# Patient Record
Sex: MALE | Race: WHITE | NOT HISPANIC OR LATINO | URBAN - METROPOLITAN AREA
[De-identification: names, ages, dates, MRNs, and addresses within clinical notes are randomized per-mention and may not be internally consistent; named-entity substitution may affect disease eponyms.]

---

## 2017-08-23 ENCOUNTER — EMERGENCY (EMERGENCY)
Facility: HOSPITAL | Age: 72
LOS: 0 days | Discharge: HOME | End: 2017-08-23
Attending: EMERGENCY MEDICINE | Admitting: EMERGENCY MEDICINE
Payer: MEDICARE

## 2017-08-23 VITALS
SYSTOLIC BLOOD PRESSURE: 139 MMHG | HEART RATE: 78 BPM | RESPIRATION RATE: 18 BRPM | DIASTOLIC BLOOD PRESSURE: 76 MMHG | TEMPERATURE: 98 F | OXYGEN SATURATION: 100 %

## 2017-08-23 VITALS
SYSTOLIC BLOOD PRESSURE: 143 MMHG | DIASTOLIC BLOOD PRESSURE: 78 MMHG | OXYGEN SATURATION: 100 % | HEIGHT: 67 IN | HEART RATE: 97 BPM | WEIGHT: 190.04 LBS | TEMPERATURE: 98 F | RESPIRATION RATE: 18 BRPM

## 2017-08-23 DIAGNOSIS — R68.83 CHILLS (WITHOUT FEVER): ICD-10-CM

## 2017-08-23 LAB
ALBUMIN SERPL ELPH-MCNC: 3.9 G/DL — SIGNIFICANT CHANGE UP (ref 3.3–5)
ALP SERPL-CCNC: 88 U/L — SIGNIFICANT CHANGE UP (ref 40–120)
ALT FLD-CCNC: 35 U/L — SIGNIFICANT CHANGE UP (ref 12–78)
ANION GAP SERPL CALC-SCNC: 11 MMOL/L — SIGNIFICANT CHANGE UP (ref 5–17)
ANISOCYTOSIS BLD QL: SIGNIFICANT CHANGE UP
APPEARANCE UR: CLEAR — SIGNIFICANT CHANGE UP
APTT BLD: 27.2 SEC — LOW (ref 27.5–37.4)
AST SERPL-CCNC: 16 U/L — SIGNIFICANT CHANGE UP (ref 15–37)
BASO STIPL BLD QL SMEAR: SLIGHT — SIGNIFICANT CHANGE UP
BASOPHILS # BLD AUTO: 0.1 K/UL — SIGNIFICANT CHANGE UP (ref 0–0.2)
BASOPHILS NFR BLD AUTO: 1.2 % — SIGNIFICANT CHANGE UP (ref 0–2)
BILIRUB SERPL-MCNC: 1 MG/DL — SIGNIFICANT CHANGE UP (ref 0.2–1.2)
BILIRUB UR-MCNC: NEGATIVE — SIGNIFICANT CHANGE UP
BUN SERPL-MCNC: 27 MG/DL — HIGH (ref 7–23)
BURR CELLS BLD QL SMEAR: PRESENT — SIGNIFICANT CHANGE UP
CALCIUM SERPL-MCNC: 8.8 MG/DL — SIGNIFICANT CHANGE UP (ref 8.5–10.1)
CHLORIDE SERPL-SCNC: 104 MMOL/L — SIGNIFICANT CHANGE UP (ref 96–108)
CO2 SERPL-SCNC: 24 MMOL/L — SIGNIFICANT CHANGE UP (ref 22–31)
COLOR SPEC: YELLOW — SIGNIFICANT CHANGE UP
CREAT SERPL-MCNC: 1.46 MG/DL — HIGH (ref 0.5–1.3)
DACRYOCYTES BLD QL SMEAR: SIGNIFICANT CHANGE UP
DIFF PNL FLD: NEGATIVE — SIGNIFICANT CHANGE UP
ELLIPTOCYTES BLD QL SMEAR: SIGNIFICANT CHANGE UP
EOSINOPHIL # BLD AUTO: 0.1 K/UL — SIGNIFICANT CHANGE UP (ref 0–0.5)
EOSINOPHIL NFR BLD AUTO: 1.5 % — SIGNIFICANT CHANGE UP (ref 0–6)
GLUCOSE SERPL-MCNC: 127 MG/DL — HIGH (ref 70–99)
GLUCOSE UR QL: NEGATIVE MG/DL — SIGNIFICANT CHANGE UP
HCT VFR BLD CALC: 34.3 % — LOW (ref 39–50)
HGB BLD-MCNC: 10.5 G/DL — LOW (ref 13–17)
HYPOCHROMIA BLD QL: SIGNIFICANT CHANGE UP
INR BLD: 1.2 RATIO — HIGH (ref 0.88–1.16)
KETONES UR-MCNC: NEGATIVE — SIGNIFICANT CHANGE UP
LEUKOCYTE ESTERASE UR-ACNC: NEGATIVE — SIGNIFICANT CHANGE UP
LYMPHOCYTES # BLD AUTO: 1.1 K/UL — SIGNIFICANT CHANGE UP (ref 1–3.3)
LYMPHOCYTES # BLD AUTO: 14 % — SIGNIFICANT CHANGE UP (ref 13–44)
MACROCYTES BLD QL: SLIGHT — SIGNIFICANT CHANGE UP
MAGNESIUM SERPL-MCNC: 2.2 MG/DL — SIGNIFICANT CHANGE UP (ref 1.6–2.6)
MANUAL DIF COMMENT BLD-IMP: SIGNIFICANT CHANGE UP
MCHC RBC-ENTMCNC: 18.8 PG — LOW (ref 27–34)
MCHC RBC-ENTMCNC: 30.5 GM/DL — LOW (ref 32–36)
MCV RBC AUTO: 61.7 FL — LOW (ref 80–100)
MICROCYTES BLD QL: SIGNIFICANT CHANGE UP
MONOCYTES # BLD AUTO: 0.4 K/UL — SIGNIFICANT CHANGE UP (ref 0–0.9)
MONOCYTES NFR BLD AUTO: 5.5 % — SIGNIFICANT CHANGE UP (ref 2–14)
NEUTROPHILS # BLD AUTO: 5.8 K/UL — SIGNIFICANT CHANGE UP (ref 1.8–7.4)
NEUTROPHILS NFR BLD AUTO: 77.8 % — HIGH (ref 43–77)
NITRITE UR-MCNC: NEGATIVE — SIGNIFICANT CHANGE UP
PH UR: 6 — SIGNIFICANT CHANGE UP (ref 5–8)
PLAT MORPH BLD: NORMAL — SIGNIFICANT CHANGE UP
PLATELET # BLD AUTO: 194 K/UL — SIGNIFICANT CHANGE UP (ref 150–400)
PLATELET COUNT - ESTIMATE: NORMAL — SIGNIFICANT CHANGE UP
POIKILOCYTOSIS BLD QL AUTO: SIGNIFICANT CHANGE UP
POLYCHROMASIA BLD QL SMEAR: SLIGHT — SIGNIFICANT CHANGE UP
POTASSIUM SERPL-MCNC: 3.2 MMOL/L — LOW (ref 3.5–5.3)
POTASSIUM SERPL-SCNC: 3.2 MMOL/L — LOW (ref 3.5–5.3)
PROT SERPL-MCNC: 7.1 GM/DL — SIGNIFICANT CHANGE UP (ref 6–8.3)
PROT UR-MCNC: NEGATIVE MG/DL — SIGNIFICANT CHANGE UP
PROTHROM AB SERPL-ACNC: 13 SEC — HIGH (ref 9.8–12.7)
RBC # BLD: 5.56 M/UL — SIGNIFICANT CHANGE UP (ref 4.2–5.8)
RBC # FLD: 17.2 % — HIGH (ref 10.3–14.5)
RBC BLD AUTO: (no result)
SCHISTOCYTES BLD QL AUTO: SIGNIFICANT CHANGE UP
SODIUM SERPL-SCNC: 139 MMOL/L — SIGNIFICANT CHANGE UP (ref 135–145)
SP GR SPEC: 1.01 — SIGNIFICANT CHANGE UP (ref 1.01–1.02)
TARGETS BLD QL SMEAR: SIGNIFICANT CHANGE UP
UROBILINOGEN FLD QL: NEGATIVE MG/DL — SIGNIFICANT CHANGE UP
WBC # BLD: 7.5 K/UL — SIGNIFICANT CHANGE UP (ref 3.8–10.5)
WBC # FLD AUTO: 7.5 K/UL — SIGNIFICANT CHANGE UP (ref 3.8–10.5)

## 2017-08-23 PROCEDURE — 99283 EMERGENCY DEPT VISIT LOW MDM: CPT

## 2017-08-23 PROCEDURE — 93010 ELECTROCARDIOGRAM REPORT: CPT

## 2017-08-23 RX ORDER — POTASSIUM CHLORIDE 20 MEQ
40 PACKET (EA) ORAL ONCE
Qty: 0 | Refills: 0 | Status: COMPLETED | OUTPATIENT
Start: 2017-08-23 | End: 2017-08-23

## 2017-08-23 RX ORDER — POTASSIUM CHLORIDE 20 MEQ
40 PACKET (EA) ORAL ONCE
Qty: 0 | Refills: 0 | Status: DISCONTINUED | OUTPATIENT
Start: 2017-08-23 | End: 2017-08-23

## 2017-08-23 RX ORDER — SODIUM CHLORIDE 9 MG/ML
1000 INJECTION INTRAMUSCULAR; INTRAVENOUS; SUBCUTANEOUS ONCE
Qty: 0 | Refills: 0 | Status: COMPLETED | OUTPATIENT
Start: 2017-08-23 | End: 2017-08-23

## 2017-08-23 RX ADMIN — SODIUM CHLORIDE 1000 MILLILITER(S): 9 INJECTION INTRAMUSCULAR; INTRAVENOUS; SUBCUTANEOUS at 13:30

## 2017-08-23 RX ADMIN — Medication 40 MILLIEQUIVALENT(S): at 15:59

## 2017-08-23 NOTE — ED ADULT NURSE NOTE - OBJECTIVE STATEMENT
patient states that he has a h/o throat cancer dx 13 yrs ago tx with RT, patient states that 6 wks ago he started to feel like his throat was swollen and was seen by ENT 6 days ago and started on prednisone, taper last night patient started to feel very weak and shaky, spoke with ENT who told patient to d/c prednisone. patient continued to feel shaky and weak, came to ER for further evaluation

## 2017-08-23 NOTE — ED PROVIDER NOTE - CPE EDP HEAD NORM PED
Spine appears normal, range of motion is not limited, no muscle or joint tenderness Head atraumatic, normal cephalic shape.

## 2017-08-23 NOTE — ED PROVIDER NOTE - PROGRESS NOTE DETAILS
patient afebrile with no laboratory abnormalities requiring admission. patient appropriate for discharge with PMD f/u. precautions when to return to the ED given and understood.

## 2017-08-23 NOTE — ED PROVIDER NOTE - OBJECTIVE STATEMENT
72 y/o M with a PMHx of throat CA, thalassemia presents to the ED c/o worsening weakness over the past day. Pt states that he was recently started on Prednisone for sinus infection and noticed sx soon afterward. Pt states that he called PMD who advised to come to the ED if sx persist today. Pt currently calm, afebrile here in ED and denies any other acute c/o at this time.

## 2017-08-23 NOTE — ED ADULT TRIAGE NOTE - CHIEF COMPLAINT QUOTE
Patient comes to ED for shaky and weakness since yesterday. pt is on prednisone for sinus problems, spoke to PCP yesterday stated the feeling would pass. pt woke up this morning still feeling shaky

## 2017-08-24 LAB
CULTURE RESULTS: NO GROWTH — SIGNIFICANT CHANGE UP
SPECIMEN SOURCE: SIGNIFICANT CHANGE UP

## 2017-08-25 ENCOUNTER — INPATIENT (INPATIENT)
Facility: HOSPITAL | Age: 72
LOS: 2 days | Discharge: HOME | End: 2017-08-28
Attending: INTERNAL MEDICINE | Admitting: INTERNAL MEDICINE
Payer: MEDICARE

## 2017-08-25 VITALS
HEART RATE: 103 BPM | DIASTOLIC BLOOD PRESSURE: 77 MMHG | RESPIRATION RATE: 16 BRPM | OXYGEN SATURATION: 100 % | HEIGHT: 67 IN | SYSTOLIC BLOOD PRESSURE: 132 MMHG | TEMPERATURE: 98 F | WEIGHT: 190.04 LBS

## 2017-08-25 DIAGNOSIS — Z90.49 ACQUIRED ABSENCE OF OTHER SPECIFIED PARTS OF DIGESTIVE TRACT: Chronic | ICD-10-CM

## 2017-08-25 LAB
ADD ON TEST-SPECIMEN IN LAB: SIGNIFICANT CHANGE UP
ALBUMIN SERPL ELPH-MCNC: 4 G/DL — SIGNIFICANT CHANGE UP (ref 3.3–5)
ALP SERPL-CCNC: 90 U/L — SIGNIFICANT CHANGE UP (ref 40–120)
ALT FLD-CCNC: 32 U/L — SIGNIFICANT CHANGE UP (ref 12–78)
ANION GAP SERPL CALC-SCNC: 8 MMOL/L — SIGNIFICANT CHANGE UP (ref 5–17)
ANISOCYTOSIS BLD QL: SIGNIFICANT CHANGE UP
APPEARANCE UR: CLEAR — SIGNIFICANT CHANGE UP
AST SERPL-CCNC: 15 U/L — SIGNIFICANT CHANGE UP (ref 15–37)
BASO STIPL BLD QL SMEAR: SLIGHT — SIGNIFICANT CHANGE UP
BASOPHILS # BLD AUTO: 0.1 K/UL — SIGNIFICANT CHANGE UP (ref 0–0.2)
BASOPHILS NFR BLD AUTO: 0.9 % — SIGNIFICANT CHANGE UP (ref 0–2)
BILIRUB SERPL-MCNC: 1 MG/DL — SIGNIFICANT CHANGE UP (ref 0.2–1.2)
BILIRUB UR-MCNC: NEGATIVE — SIGNIFICANT CHANGE UP
BUN SERPL-MCNC: 23 MG/DL — SIGNIFICANT CHANGE UP (ref 7–23)
CALCIUM SERPL-MCNC: 8.8 MG/DL — SIGNIFICANT CHANGE UP (ref 8.5–10.1)
CHLORIDE SERPL-SCNC: 106 MMOL/L — SIGNIFICANT CHANGE UP (ref 96–108)
CK SERPL-CCNC: 32 U/L — SIGNIFICANT CHANGE UP (ref 26–308)
CO2 SERPL-SCNC: 24 MMOL/L — SIGNIFICANT CHANGE UP (ref 22–31)
COLOR SPEC: YELLOW — SIGNIFICANT CHANGE UP
CREAT SERPL-MCNC: 1.34 MG/DL — HIGH (ref 0.5–1.3)
DACRYOCYTES BLD QL SMEAR: SIGNIFICANT CHANGE UP
DIFF PNL FLD: NEGATIVE — SIGNIFICANT CHANGE UP
ELLIPTOCYTES BLD QL SMEAR: SLIGHT — SIGNIFICANT CHANGE UP
EOSINOPHIL # BLD AUTO: 0.1 K/UL — SIGNIFICANT CHANGE UP (ref 0–0.5)
EOSINOPHIL NFR BLD AUTO: 1.2 % — SIGNIFICANT CHANGE UP (ref 0–6)
GLUCOSE SERPL-MCNC: 126 MG/DL — HIGH (ref 70–99)
GLUCOSE UR QL: NEGATIVE MG/DL — SIGNIFICANT CHANGE UP
HCT VFR BLD CALC: 35.1 % — LOW (ref 39–50)
HGB BLD-MCNC: 10.6 G/DL — LOW (ref 13–17)
HYPOCHROMIA BLD QL: SIGNIFICANT CHANGE UP
KETONES UR-MCNC: NEGATIVE — SIGNIFICANT CHANGE UP
LEUKOCYTE ESTERASE UR-ACNC: NEGATIVE — SIGNIFICANT CHANGE UP
LYMPHOCYTES # BLD AUTO: 0.8 K/UL — LOW (ref 1–3.3)
LYMPHOCYTES # BLD AUTO: 9.2 % — LOW (ref 13–44)
MACROCYTES BLD QL: SIGNIFICANT CHANGE UP
MAGNESIUM SERPL-MCNC: 2.4 MG/DL — SIGNIFICANT CHANGE UP (ref 1.6–2.6)
MANUAL DIF COMMENT BLD-IMP: SIGNIFICANT CHANGE UP
MCHC RBC-ENTMCNC: 18.9 PG — LOW (ref 27–34)
MCHC RBC-ENTMCNC: 30.3 GM/DL — LOW (ref 32–36)
MCV RBC AUTO: 62.4 FL — LOW (ref 80–100)
MICROCYTES BLD QL: SIGNIFICANT CHANGE UP
MONOCYTES # BLD AUTO: 0.5 K/UL — SIGNIFICANT CHANGE UP (ref 0–0.9)
MONOCYTES NFR BLD AUTO: 5.9 % — SIGNIFICANT CHANGE UP (ref 2–14)
NEUTROPHILS # BLD AUTO: 7 K/UL — SIGNIFICANT CHANGE UP (ref 1.8–7.4)
NEUTROPHILS NFR BLD AUTO: 82.8 % — HIGH (ref 43–77)
NITRITE UR-MCNC: NEGATIVE — SIGNIFICANT CHANGE UP
OVALOCYTES BLD QL SMEAR: SLIGHT — SIGNIFICANT CHANGE UP
PH UR: 6.5 — SIGNIFICANT CHANGE UP (ref 5–8)
PLAT MORPH BLD: NORMAL — SIGNIFICANT CHANGE UP
PLATELET # BLD AUTO: 180 K/UL — SIGNIFICANT CHANGE UP (ref 150–400)
POIKILOCYTOSIS BLD QL AUTO: SIGNIFICANT CHANGE UP
POLYCHROMASIA BLD QL SMEAR: SLIGHT — SIGNIFICANT CHANGE UP
POTASSIUM SERPL-MCNC: 3.7 MMOL/L — SIGNIFICANT CHANGE UP (ref 3.5–5.3)
POTASSIUM SERPL-SCNC: 3.7 MMOL/L — SIGNIFICANT CHANGE UP (ref 3.5–5.3)
PROT SERPL-MCNC: 7.4 GM/DL — SIGNIFICANT CHANGE UP (ref 6–8.3)
PROT UR-MCNC: NEGATIVE MG/DL — SIGNIFICANT CHANGE UP
RBC # BLD: 5.63 M/UL — SIGNIFICANT CHANGE UP (ref 4.2–5.8)
RBC # FLD: 17.3 % — HIGH (ref 10.3–14.5)
RBC BLD AUTO: (no result)
SCHISTOCYTES BLD QL AUTO: SLIGHT — SIGNIFICANT CHANGE UP
SICKLE CELLS BLD QL SMEAR: SLIGHT — SIGNIFICANT CHANGE UP
SODIUM SERPL-SCNC: 138 MMOL/L — SIGNIFICANT CHANGE UP (ref 135–145)
SP GR SPEC: 1 — LOW (ref 1.01–1.02)
TARGETS BLD QL SMEAR: SIGNIFICANT CHANGE UP
TROPONIN I SERPL-MCNC: <0.015 NG/ML — SIGNIFICANT CHANGE UP (ref 0.01–0.04)
UROBILINOGEN FLD QL: NEGATIVE MG/DL — SIGNIFICANT CHANGE UP
WBC # BLD: 8.4 K/UL — SIGNIFICANT CHANGE UP (ref 3.8–10.5)
WBC # FLD AUTO: 8.4 K/UL — SIGNIFICANT CHANGE UP (ref 3.8–10.5)

## 2017-08-25 PROCEDURE — 99285 EMERGENCY DEPT VISIT HI MDM: CPT

## 2017-08-25 PROCEDURE — 70450 CT HEAD/BRAIN W/O DYE: CPT | Mod: 26

## 2017-08-25 PROCEDURE — 93010 ELECTROCARDIOGRAM REPORT: CPT

## 2017-08-25 PROCEDURE — 71010: CPT | Mod: 26

## 2017-08-25 RX ORDER — SENNA PLUS 8.6 MG/1
2 TABLET ORAL AT BEDTIME
Qty: 0 | Refills: 0 | Status: DISCONTINUED | OUTPATIENT
Start: 2017-08-25 | End: 2017-08-28

## 2017-08-25 RX ORDER — SODIUM CHLORIDE 9 MG/ML
1000 INJECTION INTRAMUSCULAR; INTRAVENOUS; SUBCUTANEOUS ONCE
Qty: 0 | Refills: 0 | Status: COMPLETED | OUTPATIENT
Start: 2017-08-25 | End: 2017-08-25

## 2017-08-25 RX ORDER — SODIUM CHLORIDE 9 MG/ML
1000 INJECTION INTRAMUSCULAR; INTRAVENOUS; SUBCUTANEOUS
Qty: 0 | Refills: 0 | Status: DISCONTINUED | OUTPATIENT
Start: 2017-08-25 | End: 2017-08-28

## 2017-08-25 RX ORDER — ACETAMINOPHEN 500 MG
650 TABLET ORAL EVERY 6 HOURS
Qty: 0 | Refills: 0 | Status: DISCONTINUED | OUTPATIENT
Start: 2017-08-25 | End: 2017-08-28

## 2017-08-25 RX ORDER — LEVOTHYROXINE SODIUM 125 MCG
25 TABLET ORAL DAILY
Qty: 0 | Refills: 0 | Status: DISCONTINUED | OUTPATIENT
Start: 2017-08-26 | End: 2017-08-28

## 2017-08-25 RX ORDER — DOCUSATE SODIUM 100 MG
100 CAPSULE ORAL THREE TIMES A DAY
Qty: 0 | Refills: 0 | Status: DISCONTINUED | OUTPATIENT
Start: 2017-08-25 | End: 2017-08-28

## 2017-08-25 RX ADMIN — SODIUM CHLORIDE 1000 MILLILITER(S): 9 INJECTION INTRAMUSCULAR; INTRAVENOUS; SUBCUTANEOUS at 15:15

## 2017-08-25 NOTE — ED PROVIDER NOTE - OBJECTIVE STATEMENT
71 yo male h/o throat cancer s/p RT, presents c/o weakness for about 1 week. Recently went to his ENT Dr. Parra and was placed on 6 days of prednisone for throat/sinus issues. On Tuesday afternoon after he finished the prednisone he had weakness, chills, and shaking. Saw his PCP Dr. Baldwin yesterday, was noted to have low BP, told to stop htn meds, drink a lot of fluids, and follow up on Monday, but he felt worse so he came into the ER. Denies cough, vomiting, diarrhea, peripheral edema. +Lower back pain. Seen in ER for similar 2 days ago. 73 yo male h/o thalassemia, throat cancer s/p RT, presents c/o weakness for about 1 week. Recently went to his ENT Dr. Parra and was placed on 6 days of prednisone for throat/sinus issues. On Tuesday afternoon after he finished the prednisone he had weakness, chills, and shaking. Saw his PCP Dr. Baldwin yesterday, was noted to have low BP, told to stop htn meds, drink a lot of fluids, and follow up on Monday, but he felt worse so he came into the ER. Denies cough, vomiting, diarrhea, peripheral edema. +Lower back pain. Seen in ER for similar 2 days ago.

## 2017-08-25 NOTE — ED ADULT NURSE REASSESSMENT NOTE - NS ED NURSE REASSESS COMMENT FT1
Pt alert and oriented. vs as charted. pt ambulated as per MD. Pt appears steady but states he feels "shaky and weak." pt and family updated on plan of care. no acute distress noted at this time. will continue to monitor.

## 2017-08-25 NOTE — H&P ADULT - ASSESSMENT
73 y/o M PMHx significant for HTN, b-thalassemia (minor), hx of Head and Neck Ca (s/p RT), former smoker, multinodular goiter, GERD, and prostatitis who was evaluated in the ED on 8/23 for symptoms of generalized weakness/malaise and chills which began on Tuesday 8/22. The patient was recently started on an outpatient 6 day course of Prednisone (completed on Tuesday) by his ENT (Dr. Parra). The patient admits to symptoms of associated rigors. He was formally evaluated by his PCP yesterday (pe ED documentation the pateint was found to have "low BP" at that time).       1)Generalized weakness/Malaise/Rigors with unknown etiology  ~DDx is vast; r/o sepsis (although does not fit SIRS and no apparent source of acute infxn) vs. adrenal insufficiency (VSS in the ED thus far), vs. Steroid induced myopathy vs. Hypothyroidism (pt on low dose levothyroxine)  ~f/u serum Cortisol  ~f/u TFTs  ~cont. IV fluids  ~CPK wNL  ~would PAN C+S  ~pt has had hx of prostatitis    2)HTN  ~will hold his anti-hypertensives for now    3)GERD  ~cont. PPI    4)Vte ppx  ~cont. Heparin sq 71 y/o M PMHx significant for HTN, b-thalassemia (minor), hx of Laryngeal Ca stage I (s/p RT), former smoker, radiation-induced hypothyroidism, GERD, BPH, and prostatitis who was evaluated in the ED on 8/23 for symptoms of generalized weakness/malaise and chills which began on Tuesday 8/22. The patient was recently evaluated by his ENT (Dr. Parra) last Thursday (8/17) for "throat soreness" and was diagnosed w/ laryngeal edema -> started on an outpatient (10 day) course of Prednisone (took 6/10 days stopped on Tuesday) as he had c/o generalized weakness/malaise and associated rigors. The patient then was formally evaluated by his PCP yesterday where he was noted have "a low blood pressure" -> was 105/56 (pt is normally hypertensive) thus told to hold his normally prescribed daily Hyzaar. The patient denies any associated shortness of breath, or fevers.      1)Generalized weakness/Malaise/Rigors with unknown etiology  ~DDx is vast; r/o sepsis (although does not fit SIRS and no apparent source of acute infxn) vs. adrenal insufficiency (VSS in the ED thus far), vs. Steroid induced myopathy vs. Hypothyroidism (pt on low dose levothyroxine)  ~f/u serum Cortisol  ~f/u TFTs  ~cont. IV fluids  ~CPK wNL  ~would PAN C+S  ~pt has had hx of prostatitis  ~f/u w/ ENT in the am  ~will give Dexamethasone 4mg IV bolus x 1    2)HTN  ~will hold his anti-hypertensives for now    3)GERD  ~cont. PPI    4)Vte ppx  ~cont. Heparin sq

## 2017-08-25 NOTE — H&P ADULT - PMH
Essential hypertension    Hypothyroidism, secondary    Retinopathy of right eye    Thalassemia major    Throat cancer

## 2017-08-25 NOTE — H&P ADULT - NSHPPHYSICALEXAM_GEN_ALL_CORE
Vital Signs Last 24 Hrs  T(C): 36.8 (25 Aug 2017 20:07), Max: 37.1 (25 Aug 2017 17:37)  T(F): 98.3 (25 Aug 2017 20:07), Max: 98.7 (25 Aug 2017 17:37)  HR: 93 (25 Aug 2017 20:07) (73 - 103)  BP: 140/78 (25 Aug 2017 20:07) (132/77 - 140/78)  RR: 18 (25 Aug 2017 20:07) (16 - 18)  SpO2: 99% (25 Aug 2017 20:07) (98% - 100%)

## 2017-08-25 NOTE — H&P ADULT - HISTORY OF PRESENT ILLNESS
71 y/o M PMHx significant for HTN, b-thalassemia (minor), hx of Head and Neck Ca (s/p RT), former smoker, multinodular goiter, GERD, and prostatitis who was evaluated in the ED on 8/23 for symptoms of generalized weakness/malaise and chills which began on Tuesday 8/22. The patient was recently started on an outpatient 6 day course of Prednisone (completed on Tuesday) by his ENT (Dr. Parra). The patient admits to symptoms of associated rigors. He was formally evaluated by his PCP yesterday (pe ED documentation the patient was found to have "low BP" at that time). 71 y/o M PMHx significant for HTN, b-thalassemia (minor), hx of Laryngeal Ca stage I (s/p RT), former smoker, radiation-induced hypothyroidism, GERD, BPH, and prostatitis who was evaluated in the ED on 8/23 for symptoms of generalized weakness/malaise and chills which began on Tuesday 8/22. The patient was recently evaluated by his ENT (Dr. Parra) last Thursday (8/17) for "throat soreness" and was diagnosed w/ laryngeal edema -> started on an outpatient (10 day) course of Prednisone (took 6/10 days stopped on Tuesday) as he had c/o generalized weakness/malaise and associated rigors. The patient then was formally evaluated by his PCP yesterday where he was noted have "a low blood pressure" -> was 105/56 (pt is normally hypertensive) thus told to hold his normally prescribed daily Hyzaar. The patient denies any associated shortness of breath, or fevers.

## 2017-08-25 NOTE — H&P ADULT - FAMILY HISTORY
Father  Still living? Unknown  Family history of early CAD, Age at diagnosis: Age Unknown     Mother  Still living? Unknown  Family history of early CAD, Age at diagnosis: Age Unknown     Sibling  Still living? Unknown  Family history of breast cancer, Age at diagnosis: Age Unknown

## 2017-08-25 NOTE — ED PROVIDER NOTE - PROGRESS NOTE DETAILS
Pop Markham ([facility]) for Dr. Cowart: Spoke with Dr. Perez covering for Dr. Baldwin. Suggests admission for further workup as this is patient's second ER visit in two days, r/o renal insufficiency and hematology evaluation due to marked tear drop cells in CBC Pop Markham ([facility]) for Dr. Cowart: Spoke with Dr. Perez covering for Dr. Baldwin. Suggests admission for further workup as this is patient's second ER visit in two days, r/o renal insufficiency Pop Markham ([facility]) for Dr. Cowart: Spoke with Dr. Hodgson covering for Dr. Davalos. States that thalessemia shouldn't be the cause of his symptoms as his H&H is stable. Says if patient is admitted she can be consulted but otherwise there's no emergent need for her to see the patient

## 2017-08-25 NOTE — ED PROVIDER NOTE - NS_ ATTENDINGSCRIBEDETAILS _ED_A_ED_FT
I, Zurdo Cowart, performed the initial face to face bedside interview with this patient regarding history of present illness, review of symptoms and relevant past medical, social and family history.  I completed an independent physical examination.  I was the initial provider who evaluated this patient.  The history, relevant review of systems, past medical and surgical history, medical decision making, and physical examination was documented by the scribe in my presence and I attest to the accuracy of the documentation.

## 2017-08-26 LAB
ALBUMIN SERPL ELPH-MCNC: 3.7 G/DL — SIGNIFICANT CHANGE UP (ref 3.3–5)
ALP SERPL-CCNC: 80 U/L — SIGNIFICANT CHANGE UP (ref 40–120)
ALT FLD-CCNC: 28 U/L — SIGNIFICANT CHANGE UP (ref 12–78)
ANION GAP SERPL CALC-SCNC: 11 MMOL/L — SIGNIFICANT CHANGE UP (ref 5–17)
AST SERPL-CCNC: 12 U/L — LOW (ref 15–37)
BASOPHILS # BLD AUTO: 0.1 K/UL — SIGNIFICANT CHANGE UP (ref 0–0.2)
BASOPHILS NFR BLD AUTO: 0.7 % — SIGNIFICANT CHANGE UP (ref 0–2)
BILIRUB SERPL-MCNC: 1.1 MG/DL — SIGNIFICANT CHANGE UP (ref 0.2–1.2)
BUN SERPL-MCNC: 20 MG/DL — SIGNIFICANT CHANGE UP (ref 7–23)
CALCIUM SERPL-MCNC: 8.6 MG/DL — SIGNIFICANT CHANGE UP (ref 8.5–10.1)
CHLORIDE SERPL-SCNC: 109 MMOL/L — HIGH (ref 96–108)
CO2 SERPL-SCNC: 20 MMOL/L — LOW (ref 22–31)
CORTIS F PM SERPL-MCNC: 5.9 UG/DL — SIGNIFICANT CHANGE UP (ref 2.7–10.5)
CREAT SERPL-MCNC: 1.28 MG/DL — SIGNIFICANT CHANGE UP (ref 0.5–1.3)
CULTURE RESULTS: NO GROWTH — SIGNIFICANT CHANGE UP
EOSINOPHIL # BLD AUTO: 0.1 K/UL — SIGNIFICANT CHANGE UP (ref 0–0.5)
EOSINOPHIL NFR BLD AUTO: 0.7 % — SIGNIFICANT CHANGE UP (ref 0–6)
GLUCOSE SERPL-MCNC: 126 MG/DL — HIGH (ref 70–99)
HCT VFR BLD CALC: 32.7 % — LOW (ref 39–50)
HGB BLD-MCNC: 9.8 G/DL — LOW (ref 13–17)
LYMPHOCYTES # BLD AUTO: 0.4 K/UL — LOW (ref 1–3.3)
LYMPHOCYTES # BLD AUTO: 5.1 % — LOW (ref 13–44)
MAGNESIUM SERPL-MCNC: 2.4 MG/DL — SIGNIFICANT CHANGE UP (ref 1.6–2.6)
MCHC RBC-ENTMCNC: 18.7 PG — LOW (ref 27–34)
MCHC RBC-ENTMCNC: 30 GM/DL — LOW (ref 32–36)
MCV RBC AUTO: 62.5 FL — LOW (ref 80–100)
MONOCYTES # BLD AUTO: 0.2 K/UL — SIGNIFICANT CHANGE UP (ref 0–0.9)
MONOCYTES NFR BLD AUTO: 2.1 % — SIGNIFICANT CHANGE UP (ref 2–14)
NEUTROPHILS # BLD AUTO: 7.6 K/UL — HIGH (ref 1.8–7.4)
NEUTROPHILS NFR BLD AUTO: 91.3 % — HIGH (ref 43–77)
PLATELET # BLD AUTO: 149 K/UL — LOW (ref 150–400)
POTASSIUM SERPL-MCNC: 4.3 MMOL/L — SIGNIFICANT CHANGE UP (ref 3.5–5.3)
POTASSIUM SERPL-SCNC: 4.3 MMOL/L — SIGNIFICANT CHANGE UP (ref 3.5–5.3)
PROT SERPL-MCNC: 6.6 GM/DL — SIGNIFICANT CHANGE UP (ref 6–8.3)
RBC # BLD: 5.24 M/UL — SIGNIFICANT CHANGE UP (ref 4.2–5.8)
RBC # FLD: 17.5 % — HIGH (ref 10.3–14.5)
SODIUM SERPL-SCNC: 140 MMOL/L — SIGNIFICANT CHANGE UP (ref 135–145)
SPECIMEN SOURCE: SIGNIFICANT CHANGE UP
T3 SERPL-MCNC: 89 NG/DL — SIGNIFICANT CHANGE UP (ref 80–200)
T4 AB SER-ACNC: 7.3 UG/DL — SIGNIFICANT CHANGE UP (ref 4.6–12)
TSH SERPL-MCNC: 1.9 UIU/ML — SIGNIFICANT CHANGE UP (ref 0.36–3.74)
WBC # BLD: 8.3 K/UL — SIGNIFICANT CHANGE UP (ref 3.8–10.5)
WBC # FLD AUTO: 8.3 K/UL — SIGNIFICANT CHANGE UP (ref 3.8–10.5)

## 2017-08-26 PROCEDURE — 70490 CT SOFT TISSUE NECK W/O DYE: CPT | Mod: 26

## 2017-08-26 RX ORDER — FLUTICASONE PROPIONATE 50 MCG
2 SPRAY, SUSPENSION NASAL DAILY
Qty: 0 | Refills: 0 | Status: DISCONTINUED | OUTPATIENT
Start: 2017-08-26 | End: 2017-08-26

## 2017-08-26 RX ORDER — SODIUM CHLORIDE 0.65 %
1 AEROSOL, SPRAY (ML) NASAL
Qty: 0 | Refills: 0 | Status: DISCONTINUED | OUTPATIENT
Start: 2017-08-26 | End: 2017-08-28

## 2017-08-26 RX ORDER — DOXAZOSIN MESYLATE 4 MG
1 TABLET ORAL AT BEDTIME
Qty: 0 | Refills: 0 | Status: DISCONTINUED | OUTPATIENT
Start: 2017-08-26 | End: 2017-08-28

## 2017-08-26 RX ORDER — DEXAMETHASONE 0.5 MG/5ML
4 ELIXIR ORAL ONCE
Qty: 0 | Refills: 0 | Status: COMPLETED | OUTPATIENT
Start: 2017-08-26 | End: 2017-08-26

## 2017-08-26 RX ORDER — PANTOPRAZOLE SODIUM 20 MG/1
40 TABLET, DELAYED RELEASE ORAL
Qty: 0 | Refills: 0 | Status: DISCONTINUED | OUTPATIENT
Start: 2017-08-26 | End: 2017-08-27

## 2017-08-26 RX ADMIN — SODIUM CHLORIDE 100 MILLILITER(S): 9 INJECTION INTRAMUSCULAR; INTRAVENOUS; SUBCUTANEOUS at 12:58

## 2017-08-26 RX ADMIN — Medication 25 MICROGRAM(S): at 05:10

## 2017-08-26 RX ADMIN — SODIUM CHLORIDE 100 MILLILITER(S): 9 INJECTION INTRAMUSCULAR; INTRAVENOUS; SUBCUTANEOUS at 23:27

## 2017-08-26 RX ADMIN — SODIUM CHLORIDE 100 MILLILITER(S): 9 INJECTION INTRAMUSCULAR; INTRAVENOUS; SUBCUTANEOUS at 01:21

## 2017-08-26 RX ADMIN — Medication 4 MILLIGRAM(S): at 01:21

## 2017-08-26 RX ADMIN — Medication 1 MILLIGRAM(S): at 18:33

## 2017-08-26 NOTE — PROGRESS NOTE ADULT - ASSESSMENT
71 y/o M PMHx significant for HTN, b-thalassemia (minor), hx of Laryngeal Ca stage I (s/p RT), former smoker, radiation-induced hypothyroidism, GERD, BPH, and prostatitis who was evaluated in the ED on 8/23 for symptoms of generalized weakness/malaise and chills which began on Tuesday 8/22. The patient was recently evaluated by his ENT (Dr. Parra) last Thursday (8/17) for "throat soreness" and was diagnosed w/ laryngeal edema -> started on an outpatient (10 day) course of Prednisone (took 6/10 days stopped on Tuesday) as he had c/o generalized weakness/malaise and associated rigors. The patient then was formally evaluated by his PCP  on 8/24  where he was noted have "a low blood pressure" -> was 105/56 (pt is normally hypertensive) thus told to hold his normally prescribed daily Hyzaar. The patient  was admitted on 8/25/17 with weakness , sore throat .       # Generalized weakness/Malaise/Rigors with unknown etiology  ~DDx is vast; r/o sepsis (although does not fit SIRS and no apparent source of acute infxn) vs. adrenal insufficiency (VSS in the ED thus far), vs. Steroid induced myopathy vs. Hypothyroidism (pt on low dose levothyroxine)  ~f/u serum Cortisol - pending  ~f/u TFTs wnl  ~cont. IV fluids  ~CPK wNL  ~would PAN C+S  ~ENT consult - pending  ~s/p  Dexamethasone 4mg IV bolus x 1 on 8/25  ~ pt was on 5 days course of prednisone without improvement of the sore throat  ~ CT neck -  Right thyroid nodule 6x5x5 cm with mass effect on trachea to the left  fullness in glottic region corresponding to CA, no LAD    # SUE  - c/w IV fluids  - renal sono to r/o obstruction given BPH    # Anemia, worsening, microcytic  - iron studies, B12, folate  - hematology -oncology consult    # HTN - overcontrolled  - losartan 50/HCTz 12.5 - on hold  - terazosin 10 mg - on hold    # BPH  - restart  low dose dozasosin 1 mg tonight    # GERD  ~cont. PPI    4)Vte ppx  ~cont. Heparin sq    Dispo- IV fluids, renal sono, f/u cultures, ENT , hematology evaluation

## 2017-08-26 NOTE — CONSULT NOTE ADULT - SUBJECTIVE AND OBJECTIVE BOX
Hx of Laryngeal CA 15 years ago treated with xrt. Ca free but post xrt hypothyroidism.  Recent ENT eval with laryngeal edema, rx with steroids and had severe fatigue  Since admitted to hospital c/o throat pain    Has pmh of reflux treated with Prilosec, but did not take prilosec today.  C/o of dry air in room    Head and Neck exam performed    OC nl tongue base, nl buccal mucosa, nl tonsil  OP with eryth   Neck supple, nl laryngeal crepitus  nodular thyroid    CT H&N without evidence of lesions

## 2017-08-26 NOTE — PROGRESS NOTE ADULT - SUBJECTIVE AND OBJECTIVE BOX
Subjective:  Patient is a 72y old  Male who presents with a chief complaint of Generalized Weakness     HPI:    71 y/o M PMHx significant for HTN, b-thalassemia (minor), hx of Laryngeal Ca stage I (s/p RT), former smoker, radiation-induced hypothyroidism, GERD, BPH, and prostatitis who was evaluated in the ED on  for symptoms of generalized weakness/malaise and chills which began on . The patient was recently evaluated by his ENT (Dr. Parra) last Thursday () for "throat soreness" and was diagnosed w/ laryngeal edema -> started on an outpatient (10 day) course of Prednisone (took 6/10 days stopped on Tuesday) as he had c/o generalized weakness/malaise and associated rigors. The patient then was formally evaluated by his PCP  on   where he was noted have "a low blood pressure" -> was 105/56 (pt is normally hypertensive) thus told to hold his normally prescribed daily Hyzaar. The patient  was admitted on 17 with weakness , sore throat .     17 - Patient seen and examined at bedside earlier today, + sore throat, weakness improved , Bp stable, denies dizziness, CP, palpitations    Review of system- Rest of the review of system are negative except mentioned in HPI    OBJECTIVE:   T(C): 36.7 (17 @ 14:12), Max: 37.1 (17 @ 17:37)  HR: 77 (17 @ 14:12) (62 - 103)  BP: 122/62 (17 @ 14:12) (120/68 - 149/70)  RR: 16 (17 @ 14:12) (16 - 18)  SpO2: 97% (17 @ 14:12) (97% - 100%)  Wt(kg): --  Daily Height in cm: 170.18 (25 Aug 2017 14:34)    Daily     PHYSICAL EXAM:  GENERAL: NAD  NERVOUS SYSTEM:  Alert & Oriented X3, non- focal exam  HEAD:  Atraumatic, Normocephalic  EYES: EOMI, PERRLA, conjunctiva and sclera clear  HEENT: dry  mucous membranes  NECK: Supple, No JVD  CHEST/LUNG: Clear to auscultation bilaterally; No rales, no rhonchi, no wheezing, or rubs  HEART: Regular rate and rhythm; No murmurs, rubs, or gallops  ABDOMEN: Soft, Nontender, Nondistended; Bowel sounds present  GENITOURINARY- Voiding, no suprapubic tenderness  EXTREMITIES:  2+ Peripheral Pulses, No clubbing, cyanosis, or edema  MUSCULOSKELETAL:- No muscle tenderness, Muscle tone normal, No joint tenderness, no Joint swelling, Joint range of motion-normal  SKIN-no rash, no lesion    LABS:                        9.8    8.3   )-----------( 149      ( 26 Aug 2017 05:23 )             32.7         140  |  109<H>  |  20  ----------------------------<  126<H>  4.3   |  20<L>  |  1.28    Ca    8.6      26 Aug 2017 05:23  Mg     2.4         TPro  6.6  /  Alb  3.7  /  TBili  1.1  /  DBili  x   /  AST  12<L>  /  ALT  28  /  AlkPhos  80        CARDIAC MARKERS ( 25 Aug 2017 15:12 )  <0.015 ng/mL / x     / 32 U/L / x     / x      Cortisol PM, Serum (17 @ 23:33)    Cortisol PM, Serum: 5.9: Note reference range change for CORTAM and CORTPM. ug/dL    Thyroid Stimulating Hormone, Serum (17 @ 23:33)    Thyroid Stimulating Hormone, Serum: 1.900 uIU/mL        Urinalysis Basic - ( 25 Aug 2017 15:12 )    Color: Yellow / Appearance: Clear / S.005 / pH: x  Gluc: x / Ketone: Negative  / Bili: Negative / Urobili: Negative mg/dL   Blood: x / Protein: Negative mg/dL / Nitrite: Negative   Leuk Esterase: Negative / RBC: x / WBC x   Sq Epi: x / Non Sq Epi: x / Bacteria: x        CAPILLARY BLOOD GLUCOSE    RECENT CULTURES:  Culture - Urine (17 @ 15:02)    Specimen Source: .Urine None    Culture Results:   No growth      RADIOLOGY & ADDITIONAL TESTS:  < from: CT Neck Soft Tissue No Cont (17 @ 01:12) >    No supraglottic airway narrowing.  Asymmetric soft tissue fullness in the glottic region corresponding to   known laryngeal malignancy.   No obvious bony destruction or soft tissue extension.  Enlarged right sided thyroid goiter nodule.  No massive cervical lymphadenopathy.  < from: CT Neck Soft Tissue No Cont (17 @ 01:12) >  There is a dominant right thyroid goiter nodule measuring 6.7 x 5.1 x 5.5   cm resulting in mild mass effect of the trachea to the left.    < end of copied text >    < end of copied text >    < from: CT Head No Cont (17 @ 21:28) >  IMPRESSION:   No acute territorial infarct, hemorrhage, mass or mass effect.      < end of copied text >  < from: Xray Chest 1 View AP/PA. (17 @ 15:55) >  The lungs are clear.  No pleural abnormality is seen.      Cardiomediastinal silhouette is intact.    < end of copied text >      MEDICATIONS  (STANDING):  sodium chloride 0.9%. 1000 milliLiter(s) (100 mL/Hr) IV Continuous <Continuous>  levothyroxine 25 MICROGram(s) Oral daily  doxazosin 1 milliGRAM(s) Oral at bedtime  fluticasone propionate 50 MICROgram(s)/spray Nasal Spray 2 Spray(s) Both Nostrils daily    MEDICATIONS  (PRN):  acetaminophen   Tablet 650 milliGRAM(s) Oral every 6 hours PRN For Temp greater than 38 C (100.4 F)  acetaminophen   Tablet. 650 milliGRAM(s) Oral every 6 hours PRN Mild Pain (1 - 3)  senna 2 Tablet(s) Oral at bedtime PRN Constipation  docusate sodium 100 milliGRAM(s) Oral three times a day PRN Constipation

## 2017-08-26 NOTE — CONSULT NOTE ADULT - ASSESSMENT
Throat pain without evidence of mass of ulceration    Laryngeal pharygeal reflux    No evidence of malignancy but outpatient follow up is needed to confirm    Plan refux precautions, Omeprezole 40 mg bid  biotene rinse bid  saline nasal mist rinse bid    Tea with honey  Humidify room air if not possible use face sauna

## 2017-08-27 LAB
ANION GAP SERPL CALC-SCNC: 8 MMOL/L — SIGNIFICANT CHANGE UP (ref 5–17)
BUN SERPL-MCNC: 19 MG/DL — SIGNIFICANT CHANGE UP (ref 7–23)
CALCIUM SERPL-MCNC: 8.3 MG/DL — LOW (ref 8.5–10.1)
CHLORIDE SERPL-SCNC: 110 MMOL/L — HIGH (ref 96–108)
CO2 SERPL-SCNC: 21 MMOL/L — LOW (ref 22–31)
CREAT SERPL-MCNC: 1.18 MG/DL — SIGNIFICANT CHANGE UP (ref 0.5–1.3)
FERRITIN SERPL-MCNC: 424 NG/ML — HIGH (ref 30–400)
FERRITIN SERPL-MCNC: 431 NG/ML — HIGH (ref 30–400)
FERRITIN SERPL-MCNC: 456 NG/ML — HIGH (ref 30–400)
FOLATE SERPL-MCNC: 8.5 NG/ML — SIGNIFICANT CHANGE UP (ref 4.8–24.2)
GLUCOSE SERPL-MCNC: 107 MG/DL — HIGH (ref 70–99)
HCT VFR BLD CALC: 29 % — LOW (ref 39–50)
HCT VFR BLD CALC: 29.9 % — LOW (ref 39–50)
HGB BLD-MCNC: 8.8 G/DL — LOW (ref 13–17)
HGB BLD-MCNC: 8.9 G/DL — LOW (ref 13–17)
IRON SATN MFR SERPL: 129 UG/DL — SIGNIFICANT CHANGE UP (ref 45–165)
IRON SATN MFR SERPL: 41 % — SIGNIFICANT CHANGE UP (ref 16–55)
IRON SATN MFR SERPL: 65 % — HIGH (ref 16–55)
IRON SATN MFR SERPL: 84 UG/DL — SIGNIFICANT CHANGE UP (ref 45–165)
LDH SERPL L TO P-CCNC: 139 U/L — SIGNIFICANT CHANGE UP (ref 50–242)
MCHC RBC-ENTMCNC: 19.1 PG — LOW (ref 27–34)
MCHC RBC-ENTMCNC: 30.4 GM/DL — LOW (ref 32–36)
MCV RBC AUTO: 62.9 FL — LOW (ref 80–100)
PLATELET # BLD AUTO: 203 K/UL — SIGNIFICANT CHANGE UP (ref 150–400)
POTASSIUM SERPL-MCNC: 4 MMOL/L — SIGNIFICANT CHANGE UP (ref 3.5–5.3)
POTASSIUM SERPL-SCNC: 4 MMOL/L — SIGNIFICANT CHANGE UP (ref 3.5–5.3)
RBC # BLD: 4.43 M/UL — SIGNIFICANT CHANGE UP (ref 4.2–5.8)
RBC # BLD: 4.61 M/UL — SIGNIFICANT CHANGE UP (ref 4.2–5.8)
RBC # BLD: 4.7 M/UL — SIGNIFICANT CHANGE UP (ref 4.2–5.8)
RBC # FLD: 17 % — HIGH (ref 10.3–14.5)
RETICS #: 76.1 K/UL — SIGNIFICANT CHANGE UP (ref 25–125)
RETICS #: 78.9 K/UL — SIGNIFICANT CHANGE UP (ref 25–125)
RETICS/RBC NFR: 1.6 % — SIGNIFICANT CHANGE UP (ref 0.5–2.5)
RETICS/RBC NFR: 1.8 % — SIGNIFICANT CHANGE UP (ref 0.5–2.5)
SODIUM SERPL-SCNC: 139 MMOL/L — SIGNIFICANT CHANGE UP (ref 135–145)
TIBC SERPL-MCNC: 199 UG/DL — LOW (ref 220–430)
TIBC SERPL-MCNC: 204 UG/DL — LOW (ref 220–430)
UIBC SERPL-MCNC: 120 UG/DL — SIGNIFICANT CHANGE UP (ref 110–370)
UIBC SERPL-MCNC: 70 UG/DL — LOW (ref 110–370)
VIT B12 SERPL-MCNC: 355 PG/ML — SIGNIFICANT CHANGE UP (ref 243–894)
VIT B12 SERPL-MCNC: 363 PG/ML — SIGNIFICANT CHANGE UP (ref 243–894)
WBC # BLD: 8.2 K/UL — SIGNIFICANT CHANGE UP (ref 3.8–10.5)
WBC # FLD AUTO: 8.2 K/UL — SIGNIFICANT CHANGE UP (ref 3.8–10.5)

## 2017-08-27 PROCEDURE — 76770 US EXAM ABDO BACK WALL COMP: CPT | Mod: 26

## 2017-08-27 PROCEDURE — 83020 HEMOGLOBIN ELECTROPHORESIS: CPT | Mod: 26

## 2017-08-27 RX ORDER — PANTOPRAZOLE SODIUM 20 MG/1
40 TABLET, DELAYED RELEASE ORAL
Qty: 0 | Refills: 0 | Status: DISCONTINUED | OUTPATIENT
Start: 2017-08-27 | End: 2017-08-28

## 2017-08-27 RX ADMIN — PANTOPRAZOLE SODIUM 40 MILLIGRAM(S): 20 TABLET, DELAYED RELEASE ORAL at 17:19

## 2017-08-27 RX ADMIN — Medication 1 MILLIGRAM(S): at 21:04

## 2017-08-27 RX ADMIN — PANTOPRAZOLE SODIUM 40 MILLIGRAM(S): 20 TABLET, DELAYED RELEASE ORAL at 07:29

## 2017-08-27 RX ADMIN — Medication 25 MICROGRAM(S): at 05:44

## 2017-08-27 RX ADMIN — Medication 1 SPRAY(S): at 00:24

## 2017-08-27 NOTE — CONSULT NOTE ADULT - ASSESSMENT
Impression      Laryngeal cancer S/P RT : No overt evidence of recurrens / local soft tissue swelling on imaging : RT effect ? recommend ENT FU / laryngoscopy if indicated ( tissue inspection biopsy etc)  Anemia: Parameters consistent with Thalassemia: Fe levels are normal; increasing anemia could reflect dilutional effect with fluids: check stool occult blood  Weakness / fatigue : multi-factorial, agree with endocrine LOPEZ  Nodular gliter : endocrine LOPEZ / Thyroid profile Impression      Laryngeal cancer S/P RT : No overt evidence of recurrens / local soft tissue swelling on imaging : As per ENT / possibly from reflux; there is no evidence of local or systemic recurence  Anemia: Parameters consistent with Thalassemia/: Fe levels are normal; increasing anemia could reflect dilutional effect with fluids: check stool occult blood; check B12  Weakness / fatigue : multi-factorial, agree with endocrine LOPEZ; energy level better on steroids; ? adrenal insufficieny   Nodular goiter  : endocrine LOPEZ / Thyroid profile Impression      Laryngeal cancer S/P RT : No overt evidence of recurrens / local soft tissue swelling on imaging : As per ENT / possibly from reflux; there is no evidence of local or systemic recurence    Anemia: Parameters consistent with Thalassemia/: Fe levels are normal; increasing anemia could reflect dilutional effect with fluids: check stool occult blood; If reflux symptoms: GI evaluation  Will check Hb electrophoresis     Weakness / fatigue : multi-factorial, agree with endocrine LOPEZ; energy level better on steroids; ?  adrenal  insufficieny ( prior exposure to steroids)    Nodular goiter  : endocrine LOPEZ / Thyroid profile

## 2017-08-27 NOTE — CONSULT NOTE ADULT - SUBJECTIVE AND OBJECTIVE BOX
HPI:  71 y/o M PMHx significant for HTN, b-thalassemia (minor), hx of Laryngeal Ca stage I (s/p RT), former smoker, radiation-induced hypothyroidism, GERD, BPH, and prostatitis who was evaluated in the ED on  for symptoms of generalized weakness/malaise and chills which began on . The patient was recently evaluated by his ENT (Dr. Parra) last Thursday () for "throat soreness" and was diagnosed w/ laryngeal edema -> started on an outpatient (10 day) course of Prednisone (took 6/10 days stopped on Tuesday) as he had c/o generalized weakness/malaise and associated rigors. The patient then was formally evaluated by his PCP yesterday where he was noted have "a low blood pressure" -> was 105/56 (pt is normally hypertensive) thus told to hold his normally prescribed daily Hyzaar. The patient denies any associated shortness of breath, or fevers. (25 Aug 2017 21:12)      PAST MEDICAL & SURGICAL HISTORY:  Thalassemia major  Hypothyroidism, secondary  Retinopathy of right eye  Essential hypertension  Throat cancer  S/P appendectomy: 2003      MEDICATIONS  (STANDING):  sodium chloride 0.9%. 1000 milliLiter(s) (100 mL/Hr) IV Continuous <Continuous>  levothyroxine 25 MICROGram(s) Oral daily  doxazosin 1 milliGRAM(s) Oral at bedtime  pantoprazole    Tablet 40 milliGRAM(s) Oral before breakfast    MEDICATIONS  (PRN):  acetaminophen   Tablet 650 milliGRAM(s) Oral every 6 hours PRN For Temp greater than 38 C (100.4 F)  acetaminophen   Tablet. 650 milliGRAM(s) Oral every 6 hours PRN Mild Pain (1 - 3)  senna 2 Tablet(s) Oral at bedtime PRN Constipation  docusate sodium 100 milliGRAM(s) Oral three times a day PRN Constipation  sodium chloride 0.65% Nasal 1 Spray(s) Both Nostrils every 2 hours PRN Nasal Congestion      Allergies    codeine (Unknown)  Levaquin (Unknown)  penicillins (Unknown)  shellfish (Unknown)    Intolerances        SOCIAL HISTORY:    FAMILY HISTORY:  Family history of breast cancer (Sibling)  Family history of early CAD (Father, Mother)      Vital Signs Last 24 Hrs  T(C): 36.8 (27 Aug 2017 04:56), Max: 36.9 (26 Aug 2017 20:45)  T(F): 98.3 (27 Aug 2017 04:56), Max: 98.4 (26 Aug 2017 20:45)  HR: 76 (27 Aug 2017 04:56) (76 - 88)  BP: 140/66 (27 Aug 2017 04:56) (122/62 - 160/70)  BP(mean): --  RR: 16 (27 Aug 2017 04:56) (16 - 16)  SpO2: 97% (27 Aug 2017 04:56) (97% - 97%)      LABS:                        8.8    8.2   )-----------( 203      ( 27 Aug 2017 05:57 )             29.0         139  |  110<H>  |  19  ----------------------------<  107<H>  4.0   |  21<L>  |  1.18    Ca    8.3<L>      27 Aug 2017 05:57  Mg     2.4         TPro  6.6  /  Alb  3.7  /  TBili  1.1  /  DBili  x   /  AST  12<L>  /  ALT  28  /  AlkPhos  80        Urinalysis Basic - ( 25 Aug 2017 15:12 )    Color: Yellow / Appearance: Clear / S.005 / pH: x  Gluc: x / Ketone: Negative  / Bili: Negative / Urobili: Negative mg/dL   Blood: x / Protein: Negative mg/dL / Nitrite: Negative   Leuk Esterase: Negative / RBC: x / WBC x   Sq Epi: x / Non Sq Epi: x / Bacteria: x        RADIOLOGY & ADDITIONAL STUDIES:    HPI:  71 y/o M PMHx significant for HTN, b-thalassemia (minor), hx of Laryngeal Ca stage I (s/p RT), former smoker, radiation-induced hypothyroidism, GERD, BPH, and prostatitis who was evaluated in the ED on  for symptoms of generalized weakness/malaise and chills which began on . The patient was recently evaluated by his ENT (Dr. Parra) last Thursday () for "throat soreness" and was diagnosed w/ laryngeal edema -> started on an outpatient (10 day) course of Prednisone (took 6/10 days stopped on Tuesday) as he had c/o generalized weakness/malaise and associated rigors. The patient then was formally evaluated by his PCP yesterday where he was noted have "a low blood pressure" -> was 105/56 (pt is normally hypertensive) thus told to hold his normally prescribed daily Hyzaar. The patient denies any associated shortness of breath, or fevers. (25 Aug 2017 21:12)      PAST MEDICAL & SURGICAL HISTORY:  Thalassemia major  Hypothyroidism, secondary  Retinopathy of right eye  Essential hypertension  Throat cancer  S/P appendectomy: 2003      MEDICATIONS  (STANDING):  sodium chloride 0.9%. 1000 milliLiter(s) (100 mL/Hr) IV Continuous <Continuous>  levothyroxine 25 MICROGram(s) Oral daily  doxazosin 1 milliGRAM(s) Oral at bedtime  pantoprazole    Tablet 40 milliGRAM(s) Oral before breakfast    MEDICATIONS  (PRN):  acetaminophen   Tablet 650 milliGRAM(s) Oral every 6 hours PRN For Temp greater than 38 C (100.4 F)  acetaminophen   Tablet. 650 milliGRAM(s) Oral every 6 hours PRN Mild Pain (1 - 3)  senna 2 Tablet(s) Oral at bedtime PRN Constipation  docusate sodium 100 milliGRAM(s) Oral three times a day PRN Constipation  sodium chloride 0.65% Nasal 1 Spray(s) Both Nostrils every 2 hours PRN Nasal Congestion      Allergies    codeine (Unknown)  Levaquin (Unknown)  penicillins (Unknown)  shellfish (Unknown)    Intolerances        SOCIAL HISTORY:    FAMILY HISTORY:  Family history of breast cancer (Sibling)  Family history of early CAD (Father, Mother)      Vital Signs Last 24 Hrs  T(C): 36.8 (27 Aug 2017 04:56), Max: 36.9 (26 Aug 2017 20:45)  T(F): 98.3 (27 Aug 2017 04:56), Max: 98.4 (26 Aug 2017 20:45)  HR: 76 (27 Aug 2017 04:56) (76 - 88)  BP: 140/66 (27 Aug 2017 04:56) (122/62 - 160/70)  BP(mean): --  RR: 16 (27 Aug 2017 04:56) (16 - 16)  SpO2: 97% (27 Aug 2017 04:56) (97% - 97%)      LABS:                        8.8    8.2   )-----------( 203      ( 27 Aug 2017 05:57 )             29.0         139  |  110<H>  |  19  ----------------------------<  107<H>  4.0   |  21<L>  |  1.18    Ca    8.3<L>      27 Aug 2017 05:57  Mg     2.4         TPro  6.6  /  Alb  3.7  /  TBili  1.1  /  DBili  x   /  AST  12<L>  /  ALT  28  /  AlkPhos  80        Urinalysis Basic - ( 25 Aug 2017 15:12 )    Color: Yellow / Appearance: Clear / S.005 / pH: x  Gluc: x / Ketone: Negative  / Bili: Negative / Urobili: Negative mg/dL   Blood: x / Protein: Negative mg/dL / Nitrite: Negative   Leuk Esterase: Negative / RBC: x / WBC x   Sq Epi: x / Non Sq Epi: x / Bacteria: x        RADIOLOGY & ADDITIONAL STUDIES:    < from: CT Neck Soft Tissue No Cont (17 @ 01:12) >  EXAM:  CT NECK SOFT TISSUE                            PROCEDURE DATE:  2017          INTERPRETATION:  Neck CT    Indication: Throat swelling, history of laryngeal cancer    Technique: Axial images were obtained without contrast. Reformatted   coronal and sagittal images are submitted.    FINDINGS:    Pharynx is underdistended.  There is no significant upper airway obstruction. The epiglottis is   normal.    There is asymmetric soft tissue fullness in the right glottic region   underneath the thyroid cartilage likely compatible with the patient's   history of known malignancy.     There is a dominant right thyroid goiter nodule measuring 6.7 x 5.1 x 5.5   cm resulting in mild mass effect of the trachea to the left.    There is no prevertebral soft tissue swelling.  There is mild uncinate and facet hypertrophy.  No obvious sclerotic change of cartilage or bone.  No significant cervical lymphadenopathy.      IMPRESSION:    No supraglottic airway narrowing.  Asymmetric soft tissue fullness in the glottic region corresponding to   known laryngeal malignancy.   No obvious bony destruction or soft tissue extension.  Enlarged right sided thyroid goiter nodule.  No massive cervical lymphadenopathy.        < end of copied text > HPI:  71 y/o M PMHx significant for HTN, b-thalassemia (minor), hx of Laryngeal Ca stage I (s/p RT) in , former smoker, radiation-induced hypothyroidism, GERD, BPH, and prostatitis who was evaluated in the ED on  for symptoms of generalized weakness/malaise and chills which began on . The patient was recently evaluated by his ENT (Dr. Parra) last Thursday () for "throat soreness" and was diagnosed w/ laryngeal edema -> started on an outpatient (10 day) course of Prednisone (took 6/10 days stopped on Tuesday) as he had c/o generalized weakness/malaise and associated rigors.  As per patient ENT suggested that the throat inflammation was possibly related to reflux. The patient then was formally evaluated by his PCP yesterday where he was noted have "a low blood pressure" -> was 105/56 (pt is normally hypertensive) thus told to hold his normally prescribed daily Hyzaar. The patient denies any associated shortness of breath, or fevers. (25 Aug 2017 21:12)  He was started on steroids, feeling better this AM      PAST MEDICAL & SURGICAL HISTORY:  Thalassemia MINOR : no transfusions; father from [place]  Hypothyroidism, secondary  Retinopathy of right eye  Essential hypertension  Throat cancer  S/P appendectomy: 2003      MEDICATIONS  (STANDING):  sodium chloride 0.9%. 1000 milliLiter(s) (100 mL/Hr) IV Continuous <Continuous>  levothyroxine 25 MICROGram(s) Oral daily  doxazosin 1 milliGRAM(s) Oral at bedtime  pantoprazole    Tablet 40 milliGRAM(s) Oral before breakfast    MEDICATIONS  (PRN):  acetaminophen   Tablet 650 milliGRAM(s) Oral every 6 hours PRN For Temp greater than 38 C (100.4 F)  acetaminophen   Tablet. 650 milliGRAM(s) Oral every 6 hours PRN Mild Pain (1 - 3)  senna 2 Tablet(s) Oral at bedtime PRN Constipation  docusate sodium 100 milliGRAM(s) Oral three times a day PRN Constipation  sodium chloride 0.65% Nasal 1 Spray(s) Both Nostrils every 2 hours PRN Nasal Congestion      Allergies    codeine (Unknown)  Levaquin (Unknown)  penicillins (Unknown)  shellfish (Unknown)    Intolerances        SOCIAL HISTORY:    FAMILY HISTORY:  Family history of breast cancer (Sibling)  Family history of early CAD (Father, Mother)        REVIEW OF SYSTEMS      General: Good appetite, no weight loss;  active;  able to care for self    Skin: No rash, no pruritis, no hives  	  Ophthalmologic: No visual blurring, no diplopia  	  HEENT: less sore throat    Respiratory and Thorax:  No dyspnea, cough, sputum production, hoarseness, hemoptysis. No pleurisy, chest pains  	  Cardiovascular: No palpitations, chest pains, dyspnea on exertion; no PND, orthopnea    Gastrointestinal: No abdominal pains, bloating. No reflux symptoms, dysphagia; No change in bowel habits,   diarrhea,constipation;No blood in stools    Genitourinary: No dysuria, frequency, hematuria. No flank pains    Musculoskeletal: No musculoskeletal pains, no joint pains or swelling    Neurological: No headaches, diplopia, dysarthria, dysphagia, weakness, parasthesia	    Psychiatric: No depression, anxiety	    Hematology/Lymphatics: No swollen glands, masses, edema	    Endocrine:	No hot flashes    Allergic/Immunologic:	Negative  Vital Signs Last 24 Hrs  T(C): 36.8 (27 Aug 2017 04:56), Max: 36.9 (26 Aug 2017 20:45)  T(F): 98.3 (27 Aug 2017 04:56), Max: 98.4 (26 Aug 2017 20:45)  HR: 76 (27 Aug 2017 04:56) (76 - 88)  BP: 140/66 (27 Aug 2017 04:56) (122/62 - 160/70)  BP(mean): --  RR: 16 (27 Aug 2017 04:56) (16 - 16)  SpO2: 97% (27 Aug 2017 04:56) (97% - 97%)      PHYSICAL EXAM:      Constitutional: Appears comfortable, in  NAD, A/O X 3     Eyes: EOMI, PERRLA ;No icterus    ENMT:  No oral lesions, thrush; pharynx not injected    Neck:  Supple; No masses, lymph nodes, no bruits    Breasts: NA    Back: No tenderness     Respiratory:  Clear to A/P, No wheezes, rhonchi, rales. No dullness to percussion    Cardiovascular: Normal rate and rhythm; normal S1 and S2; No murmurs. No gallop    Gastrointestinal: No distension, normal bowl sounds; No tendeness , guarding, rebound;  no masses, no hepatosplenimegaly, no fluid wave    Genitourinary: No flank pains, no inguninal adenopathy    Rectal: NA    Extremities:  No phlebitis, no edema    Vascular: No acrocyanosis, no edema    Neurological: Alert and oriented. Cranial nerves II-XII WNL, Upper extremity / lower extremity  strength WNL;  Reflexes WNL, Plantar downgoing ; No cerebellar signs    Skin: No rash, petichae, purpura, echymoses    Lymph Nodes: No cervical, supraclavicular, axillary, inguinal adenopathy    Musculoskeletal: No joint swelling    Psychiatric: Alert, oriented, normal affect      LABS:                        8.8    8.2   )-----------( 203      ( 27 Aug 2017 05:57 )             29.0         139  |  110<H>  |  19  ----------------------------<  107<H>  4.0   |  21<L>  |  1.18    Ca    8.3<L>      27 Aug 2017 05:57  Mg     2.4         TPro  6.6  /  Alb  3.7  /  TBili  1.1  /  DBili  x   /  AST  12<L>  /  ALT  28  /  AlkPhos  80        Urinalysis Basic - ( 25 Aug 2017 15:12 )    Color: Yellow / Appearance: Clear / S.005 / pH: x  Gluc: x / Ketone: Negative  / Bili: Negative / Urobili: Negative mg/dL   Blood: x / Protein: Negative mg/dL / Nitrite: Negative   Leuk Esterase: Negative / RBC: x / WBC x   Sq Epi: x / Non Sq Epi: x / Bacteria: x        RADIOLOGY & ADDITIONAL STUDIES:      l.  There is asymmetric soft tissue fullness in the right glottic region   underneath the thyroid cartilage likely compatible with the patient's   history of known malignancy.     There is a dominant right thyroid goiter nodule measuring 6.7 x 5.1 x 5.5   cm resulting in mild mass effect of the trachea to the left.    There is no prevertebral soft tissue swelling.  There is mild uncinate and facet hypertrophy.  No obvious sclerotic change of cartilage or bone.  No significant cervical lymphadenopathy.      IMPRESSION:    No supraglottic airway narrowing.  Asymmetric soft tissue fullness in the glottic region corresponding to   known laryngeal malignancy.   No obvious bony destruction or soft tissue extension.  Enlarged right sided thyroid goiter nodule.  No massive cervical lymphadenopathy.        < end of copied text >

## 2017-08-27 NOTE — CONSULT NOTE ADULT - CONSULT REASON
72 year male with history of laryngeal cancer S/P RT and B thalassemia admitted with sore throat, generalized fatigue and weakness

## 2017-08-27 NOTE — PROGRESS NOTE ADULT - ASSESSMENT
71 y/o M PMHx significant for HTN, b-thalassemia (minor), hx of Laryngeal Ca stage I (s/p RT), former smoker, radiation-induced hypothyroidism, GERD, BPH, and prostatitis who was evaluated in the ED on 8/23 for symptoms of generalized weakness/malaise and chills which began on Tuesday 8/22. The patient was recently evaluated by his ENT (Dr. Parra) last Thursday (8/17) for "throat soreness" and was diagnosed w/ laryngeal edema -> started on an outpatient (10 day) course of Prednisone (took 6/10 days stopped on Tuesday) as he had c/o generalized weakness/malaise and associated rigors. The patient then was formally evaluated by his PCP  on 8/24  where he was noted have "a low blood pressure" -> was 105/56 (pt is normally hypertensive) thus told to hold his normally prescribed daily Hyzaar. The patient  was admitted on 8/25/17 with weakness , sore throat .       # Generalized weakness/Malaise/Rigors with unknown etiology  ~DDx is vast; r/o sepsis (although does not fit SIRS and no apparent source of acute infxn) vs. adrenal insufficiency (VSS in the ED thus far), vs. Steroid induced myopathy vs. Hypothyroidism (pt on low dose levothyroxine)  ~f/u serum Cortisol - pending  ~f/u TFTs wnl  ~cont. IV fluids  ~CPK wNL  ~would PAN C+S  ~ENT consult - pending  ~s/p  Dexamethasone 4mg IV bolus x 1 on 8/25  ~ pt was on 5 days course of prednisone without improvement of the sore throat  ~ CT neck -  Right thyroid nodule 6x5x5 cm with mass effect on trachea to the left  fullness in glottic region corresponding to CA, no LAD    # SUE  - c/w IV fluids  - renal sono to r/o obstruction given BPH    # Anemia, worsening, microcytic  - iron studies, B12, folate  - hematology -oncology consult    # HTN - overcontrolled  - losartan 50/HCTz 12.5 - on hold  - terazosin 10 mg - on hold    # BPH  - restart  low dose dozasosin 1 mg tonight    # GERD  ~cont. PPI    4)Vte ppx  ~cont. Heparin sq    Dispo- IV fluids, renal sono, f/u cultures, ENT , hematology evaluation 71 y/o M PMHx significant for HTN, b-thalassemia (minor), hx of Laryngeal Ca stage I (s/p RT), former smoker, radiation-induced hypothyroidism, GERD, BPH, and prostatitis who was evaluated in the ED on 8/23 for symptoms of generalized weakness/malaise and chills which began on Tuesday 8/22. The patient was recently evaluated by his ENT (Dr. Parra) last Thursday (8/17) for "throat soreness" and was diagnosed w/ laryngeal edema -> started on an outpatient (10 day) course of Prednisone (took 6/10 days stopped on Tuesday) as he had c/o generalized weakness/malaise and associated rigors. The patient then was formally evaluated by his PCP  on 8/24  where he was noted have "a low blood pressure" -> was 105/56 (pt is normally hypertensive) thus told to hold his normally prescribed daily Hyzaar. The patient  was admitted on 8/25/17 with weakness , sore throat .       # Generalized weakness/Malaise/Rigors with unclear  etiology  - unlikely infection did  not fit SIRS and no apparent source of acute infxn, ? adrenal insufficiency stable BP after IVF and cortisol pm WNL.   Possibly due to dehydration and low BP   ~ serum Cortisol wnl  ~ TFTs wnl  ~D/c  IV fluids, good oral intake   ~CPK wNL  ~ENT consult appreciated, possibly throat pain due to GERD  ~s/p  Dexamethasone 4mg IV bolus x 1 on 8/25  ~ pt was on 5 days course of prednisone without improvement of the sore throat  ~ CT neck -  Right thyroid nodule 6x5x5 cm with mass effect on trachea to the left  fullness in glottic region corresponding to CA    # SUE, Dehydration resolved  - d/c  IV fluids, increase oral intake   - Voids well     # Anemia, H/p B-Thalassemia   - iron studies, B12, folate wnl   - occult blood pending   - Possibly due to thalassemia   - Repeat H/H this afternoon   - hematology -oncology consult pending     # HTN  with low BP on admission  - likely 2/2 dehydration ans BP meds   - S/p IVF  - Cortisol WNL  - losartan 50/HCTz 12.5 - on hold  - terazosin 10 mg - on hold  - BP improved, monitor     # BPH  - restarted on   low dose doxazosin 1 mg    # GERD  ~ restarted on  PPI    # Throat CA, s/p Radiation TX   - CT neck reviewed, no signs of recurrence  - ENT eval appreciated     # Thyroid nodule  with H/o Radiation to the neck   -  LArge nodule, will need f/u with Endo Dr Meadows and likely Bx     # Vte ppx  ~cont. Heparin sq    Dispo:  hematology evaluation, repeat H/H. POssible d/c home in am if stable

## 2017-08-27 NOTE — PROGRESS NOTE ADULT - SUBJECTIVE AND OBJECTIVE BOX
Subjective:  Patient is a 72y old  Male who presents with a chief complaint of Generalized Weakness     HPI:    73 y/o M PMHx significant for HTN, b-thalassemia (minor), hx of Laryngeal Ca stage I (s/p RT), former smoker, radiation-induced hypothyroidism, GERD, BPH, and prostatitis who was evaluated in the ED on  for symptoms of generalized weakness/malaise and chills which began on . The patient was recently evaluated by his ENT (Dr. Parra) last Thursday () for "throat soreness" and was diagnosed w/ laryngeal edema -> started on an outpatient (10 day) course of Prednisone (took 6/10 days stopped on Tuesday) as he had c/o generalized weakness/malaise and associated rigors. The patient then was formally evaluated by his PCP  on   where he was noted have "a low blood pressure" -> was 105/56 (pt is normally hypertensive) thus told to hold his normally prescribed daily Hyzaar. The patient  was admitted on 17 with weakness , sore throat .     17 - Patient seen and examined at bedside earlier today, + sore throat, weakness improved , Bp stable, denies dizziness, CP, palpitations    Review of system- Rest of the review of system are negative except mentioned in HPI    OBJECTIVE:   T(C): 36.7 (17 @ 14:12), Max: 37.1 (17 @ 17:37)  HR: 77 (17 @ 14:12) (62 - 103)  BP: 122/62 (17 @ 14:12) (120/68 - 149/70)  RR: 16 (17 @ 14:12) (16 - 18)  SpO2: 97% (17 @ 14:12) (97% - 100%)  Wt(kg): --  Daily Height in cm: 170.18 (25 Aug 2017 14:34)    Daily     PHYSICAL EXAM:  GENERAL: NAD  NERVOUS SYSTEM:  Alert & Oriented X3, non- focal exam  HEAD:  Atraumatic, Normocephalic  EYES: EOMI, PERRLA, conjunctiva and sclera clear  HEENT: dry  mucous membranes  NECK: Supple, No JVD  CHEST/LUNG: Clear to auscultation bilaterally; No rales, no rhonchi, no wheezing, or rubs  HEART: Regular rate and rhythm; No murmurs, rubs, or gallops  ABDOMEN: Soft, Nontender, Nondistended; Bowel sounds present  GENITOURINARY- Voiding, no suprapubic tenderness  EXTREMITIES:  2+ Peripheral Pulses, No clubbing, cyanosis, or edema  MUSCULOSKELETAL:- No muscle tenderness, Muscle tone normal, No joint tenderness, no Joint swelling, Joint range of motion-normal  SKIN-no rash, no lesion    LABS:                        9.8    8.3   )-----------( 149      ( 26 Aug 2017 05:23 )             32.7         140  |  109<H>  |  20  ----------------------------<  126<H>  4.3   |  20<L>  |  1.28    Ca    8.6      26 Aug 2017 05:23  Mg     2.4         TPro  6.6  /  Alb  3.7  /  TBili  1.1  /  DBili  x   /  AST  12<L>  /  ALT  28  /  AlkPhos  80        CARDIAC MARKERS ( 25 Aug 2017 15:12 )  <0.015 ng/mL / x     / 32 U/L / x     / x      Cortisol PM, Serum (17 @ 23:33)    Cortisol PM, Serum: 5.9: Note reference range change for CORTAM and CORTPM. ug/dL    Thyroid Stimulating Hormone, Serum (17 @ 23:33)    Thyroid Stimulating Hormone, Serum: 1.900 uIU/mL        Urinalysis Basic - ( 25 Aug 2017 15:12 )    Color: Yellow / Appearance: Clear / S.005 / pH: x  Gluc: x / Ketone: Negative  / Bili: Negative / Urobili: Negative mg/dL   Blood: x / Protein: Negative mg/dL / Nitrite: Negative   Leuk Esterase: Negative / RBC: x / WBC x   Sq Epi: x / Non Sq Epi: x / Bacteria: x        CAPILLARY BLOOD GLUCOSE    RECENT CULTURES:  Culture - Urine (17 @ 15:02)    Specimen Source: .Urine None    Culture Results:   No growth      RADIOLOGY & ADDITIONAL TESTS:  < from: CT Neck Soft Tissue No Cont (17 @ 01:12) >    No supraglottic airway narrowing.  Asymmetric soft tissue fullness in the glottic region corresponding to   known laryngeal malignancy.   No obvious bony destruction or soft tissue extension.  Enlarged right sided thyroid goiter nodule.  No massive cervical lymphadenopathy.  < from: CT Neck Soft Tissue No Cont (17 @ 01:12) >  There is a dominant right thyroid goiter nodule measuring 6.7 x 5.1 x 5.5   cm resulting in mild mass effect of the trachea to the left.    < end of copied text >    < end of copied text >    < from: CT Head No Cont (17 @ 21:28) >  IMPRESSION:   No acute territorial infarct, hemorrhage, mass or mass effect.      < end of copied text >  < from: Xray Chest 1 View AP/PA. (17 @ 15:55) >  The lungs are clear.  No pleural abnormality is seen.      Cardiomediastinal silhouette is intact.    < end of copied text >      MEDICATIONS  (STANDING):  sodium chloride 0.9%. 1000 milliLiter(s) (100 mL/Hr) IV Continuous <Continuous>  levothyroxine 25 MICROGram(s) Oral daily  doxazosin 1 milliGRAM(s) Oral at bedtime  fluticasone propionate 50 MICROgram(s)/spray Nasal Spray 2 Spray(s) Both Nostrils daily    MEDICATIONS  (PRN):  acetaminophen   Tablet 650 milliGRAM(s) Oral every 6 hours PRN For Temp greater than 38 C (100.4 F)  acetaminophen   Tablet. 650 milliGRAM(s) Oral every 6 hours PRN Mild Pain (1 - 3)  senna 2 Tablet(s) Oral at bedtime PRN Constipation  docusate sodium 100 milliGRAM(s) Oral three times a day PRN Constipation Subjective:  Patient is a 72y old  Male who presents with a chief complaint of Generalized Weakness     HPI:    73 y/o M PMHx significant for HTN, b-thalassemia (minor), hx of Laryngeal Ca stage I (s/p RT), former smoker, radiation-induced hypothyroidism, GERD, BPH, and prostatitis who was evaluated in the ED on  for symptoms of generalized weakness/malaise and chills which began on . The patient was recently evaluated by his ENT (Dr. Parra) last Thursday () for "throat soreness" and was diagnosed w/ laryngeal edema -> started on an outpatient (10 day) course of Prednisone (took /10 days stopped on Tuesday) as he had c/o generalized weakness/malaise and associated rigors. The patient then was formally evaluated by his PCP  on   where he was noted have "a low blood pressure" -> was 105/56 (pt is normally hypertensive) thus told to hold his normally prescribed daily Hyzaar. The patient  was admitted on 17 with weakness , sore throat .     : Chart reviewed, Pt was seen and examined, reports overall feels better, no further episodes of rigors. Reports no lightheadedness or dizziness.  Results discussed, Pt denies blood in stool or dark stools, had poor oral intake past 1-2 weeks.  Reports that last time had throid nodule image years ago. POC and d/c planning d/w Pt and wife at bedside       REVIEW OF SYSTEMS:    CONSTITUTIONAL: No weakness, fevers or chills  EYES/ENT: No visual changes;  No vertigo, mild  throat pain.   NECK: No pain or stiffness  RESPIRATORY: No cough, wheezing, hemoptysis; No shortness of breath  CARDIOVASCULAR: No chest pain or palpitations  GASTROINTESTINAL: No abdominal or epigastric pain. No nausea, vomiting, or hematemesis; No diarrhea or constipation. No melena or hematochezia.  GENITOURINARY: No dysuria, frequency or hematuria  NEUROLOGICAL: No numbness or weakness  SKIN: No itching, burning, rashes, or lesions   All other review of systems is negative unless indicated above.    OBJECTIVE:   Vital Signs Last 24 Hrs  T(C): 36.8 (27 Aug 2017 04:56), Max: 36.9 (26 Aug 2017 20:45)  T(F): 98.3 (27 Aug 2017 04:56), Max: 98.4 (26 Aug 2017 20:45)  HR: 76 (27 Aug 2017 04:56) (76 - 88)  BP: 140/66 (27 Aug 2017 04:56) (122/62 - 160/70)-  RR: 16 (27 Aug 2017 04:56) (16 - 16)  SpO2: 97% (27 Aug 2017 04:56) (97% - 97%)          PHYSICAL EXAM:  GENERAL: NAD  NERVOUS SYSTEM:  Alert & Oriented X3, non- focal exam  HEAD:  Atraumatic, Normocephalic  EYES: EOMI, PERRLA, conjunctiva and sclera clear  HEENT: moist  mucous membranes  NECK: enlarges, nodular thyroid, No JVD  CHEST/LUNG: Clear to auscultation bilaterally; No rales, no rhonchi, no wheezing, or rubs  HEART: Regular rate and rhythm; No murmurs, rubs, or gallops  ABDOMEN: Soft, Nontender, Nondistended; Bowel sounds present  GENITOURINARY- Voiding, no suprapubic tenderness  EXTREMITIES:  2+ Peripheral Pulses, No  edema  MUSCULOSKELETAL:- No muscle tenderness, Muscle tone normal, No joint tenderness, no Joint swelling, Joint range of motion-normal  SKIN-no rash, no lesion    LABS:                                   8.8    8.2   )-----------( 203      ( 27 Aug 2017 05:57 )             29.0     08-    139  |  110<H>  |  19  ----------------------------<  107<H>  4.0   |  21<L>  |  1.18    Ca    8.3<L>      27 Aug 2017 05:57  Mg     2.4     -    TPro  6.6  /  Alb  3.7  /  TBili  1.1  /  DBili  x   /  AST  12<L>  /  ALT  28  /  AlkPhos  80  -    CARDIAC MARKERS ( 25 Aug 2017 15:12 )  <0.015 ng/mL / x     / 32 U/L / x     / x        LIVER FUNCTIONS - ( 26 Aug 2017 05:23 )  Alb: 3.7 g/dL / Pro: 6.6 gm/dL / ALK PHOS: 80 U/L / ALT: 28 U/L / AST: 12 U/L / GGT: x             Urinalysis Basic - ( 25 Aug 2017 15:12 )    Color: Yellow / Appearance: Clear / S.005 / pH: x  Gluc: x / Ketone: Negative  / Bili: Negative / Urobili: Negative mg/dL   Blood: x / Protein: Negative mg/dL / Nitrite: Negative   Leuk Esterase: Negative / RBC: x / WBC x   Sq Epi: x / Non Sq Epi: x / Bacteria: x      Cortisol PM, Serum (17 @ 23:33)    Cortisol PM, Serum: 5.9: Note reference range change for CORTAM and CORTPM. ug/dL    Thyroid Stimulating Hormone, Serum (17 @ 23:33)    Thyroid Stimulating Hormone, Serum: 1.900 uIU/mL          CAPILLARY BLOOD GLUCOSE    RECENT CULTURES:  Culture - Urine (17 @ 15:02)    Specimen Source: .Urine None    Culture Results:   No growth      RADIOLOGY & ADDITIONAL TESTS:  < from: CT Neck Soft Tissue No Cont (17 @ 01:12) >    No supraglottic airway narrowing.  Asymmetric soft tissue fullness in the glottic region corresponding to   known laryngeal malignancy.   No obvious bony destruction or soft tissue extension.  Enlarged right sided thyroid goiter nodule.  No massive cervical lymphadenopathy.  < from: CT Neck Soft Tissue No Cont (17 @ 01:12) >  There is a dominant right thyroid goiter nodule measuring 6.7 x 5.1 x 5.5   cm resulting in mild mass effect of the trachea to the left.            < from: CT Head No Cont (17 @ 21:28) >  IMPRESSION:   No acute territorial infarct, hemorrhage, mass or mass effect.      < end of copied text >  < from: Xray Chest 1 View AP/PA. (17 @ 15:55) >  The lungs are clear.  No pleural abnormality is seen.      Cardiomediastinal silhouette is intact.      MEDICATIONS  (STANDING):  sodium chloride 0.9%. 1000 milliLiter(s) (100 mL/Hr) IV Continuous <Continuous>  levothyroxine 25 MICROGram(s) Oral daily  doxazosin 1 milliGRAM(s) Oral at bedtime  pantoprazole    Tablet 40 milliGRAM(s) Oral before breakfast    MEDICATIONS  (PRN):  acetaminophen   Tablet 650 milliGRAM(s) Oral every 6 hours PRN For Temp greater than 38 C (100.4 F)  acetaminophen   Tablet. 650 milliGRAM(s) Oral every 6 hours PRN Mild Pain (1 - 3)  senna 2 Tablet(s) Oral at bedtime PRN Constipation  docusate sodium 100 milliGRAM(s) Oral three times a day PRN Constipation  sodium chloride 0.65% Nasal 1 Spray(s) Both Nostrils every 2 hours PRN Nasal Congestion

## 2017-08-28 ENCOUNTER — TRANSCRIPTION ENCOUNTER (OUTPATIENT)
Age: 72
End: 2017-08-28

## 2017-08-28 VITALS — DIASTOLIC BLOOD PRESSURE: 61 MMHG | HEART RATE: 73 BPM | SYSTOLIC BLOOD PRESSURE: 141 MMHG

## 2017-08-28 LAB
ACTH SER-ACNC: 39 PG/ML — SIGNIFICANT CHANGE UP (ref 0–46)
HCT VFR BLD CALC: 31.9 % — LOW (ref 39–50)
HGB BLD-MCNC: 9.6 G/DL — LOW (ref 13–17)
MCHC RBC-ENTMCNC: 18.7 PG — LOW (ref 27–34)
MCHC RBC-ENTMCNC: 29.9 GM/DL — LOW (ref 32–36)
MCV RBC AUTO: 62.4 FL — LOW (ref 80–100)
PLATELET # BLD AUTO: 180 K/UL — SIGNIFICANT CHANGE UP (ref 150–400)
RBC # BLD: 5.12 M/UL — SIGNIFICANT CHANGE UP (ref 4.2–5.8)
RBC # FLD: 16.6 % — HIGH (ref 10.3–14.5)
TRANSFERRIN SERPL-MCNC: 147 MG/DL — LOW (ref 200–360)
WBC # BLD: 7.7 K/UL — SIGNIFICANT CHANGE UP (ref 3.8–10.5)
WBC # FLD AUTO: 7.7 K/UL — SIGNIFICANT CHANGE UP (ref 3.8–10.5)

## 2017-08-28 RX ORDER — OMEPRAZOLE 10 MG/1
1 CAPSULE, DELAYED RELEASE ORAL
Qty: 0 | Refills: 0 | COMMUNITY

## 2017-08-28 RX ORDER — DOXAZOSIN MESYLATE 4 MG
1 TABLET ORAL
Qty: 30 | Refills: 0 | OUTPATIENT
Start: 2017-08-28 | End: 2017-09-27

## 2017-08-28 RX ORDER — TERAZOSIN HYDROCHLORIDE 10 MG/1
1 CAPSULE ORAL
Qty: 0 | Refills: 0 | COMMUNITY

## 2017-08-28 RX ORDER — TERAZOSIN HYDROCHLORIDE 10 MG/1
1 CAPSULE ORAL
Qty: 30 | Refills: 0 | OUTPATIENT
Start: 2017-08-28 | End: 2017-09-27

## 2017-08-28 RX ORDER — PANTOPRAZOLE SODIUM 20 MG/1
1 TABLET, DELAYED RELEASE ORAL
Qty: 60 | Refills: 0 | OUTPATIENT
Start: 2017-08-28 | End: 2017-09-27

## 2017-08-28 RX ORDER — SODIUM CHLORIDE 0.65 %
0 AEROSOL, SPRAY (ML) NASAL
Qty: 0 | Refills: 0 | COMMUNITY
Start: 2017-08-28

## 2017-08-28 RX ADMIN — Medication 25 MICROGRAM(S): at 06:24

## 2017-08-28 NOTE — DISCHARGE NOTE ADULT - MEDICATION SUMMARY - MEDICATIONS TO STOP TAKING
I will STOP taking the medications listed below when I get home from the hospital:    predniSONE 10 mg oral tablet  -- 1 tab(s) by mouth once a day    TAPER    PriLOSEC 20 mg oral delayed release capsule  -- 1 cap(s) by mouth once a day

## 2017-08-28 NOTE — DISCHARGE NOTE ADULT - CARE PROVIDERS DIRECT ADDRESSES
[email or web address]

## 2017-08-28 NOTE — DISCHARGE NOTE ADULT - OTHER SIGNIFICANT FINDINGS
RADIOLOGY & ADDITIONAL TESTS:    < from: CT Neck Soft Tissue No Cont (08.26.17 @ 01:12)   No supraglottic airway narrowing.  Asymmetric soft tissue fullness in the glottic region corresponding to   known laryngeal malignancy.   No obvious bony destruction or soft tissue extension.  Enlarged right sided thyroid goiter nodule.  No massive cervical lymphadenopathy.  There is a dominant right thyroid goiter nodule measuring 6.7 x 5.1 x 5.5   cm resulting in mild mass effect of the trachea to the left.  IMPRESSION:   No acute territorial infarct, hemorrhage, mass or mass effect.      < from: Xray Chest 1 View AP/PA. (08.25.17 @ 15:55) >  The lungs are clear.  No pleural abnormality is seen.      Cardiomediastinal silhouette is intact.      Cortisol PM, Serum (08.25.17 @ 23:33)    Cortisol PM, Serum: 5.9: Note reference range change for CORTAM and CORTPM. ug/dL    Thyroid Stimulating Hormone, Serum (08.25.17 @ 23:33)    Thyroid Stimulating Hormone, Serum: 1.900 uIU/mL

## 2017-08-28 NOTE — DISCHARGE NOTE ADULT - HOSPITAL COURSE
73 y/o M PMHx significant for HTN, b-thalassemia (minor), hx of Laryngeal Ca stage I (s/p RT), former smoker, radiation-induced hypothyroidism, GERD, BPH, and prostatitis who was admitted on 8/23 for symptoms of generalized weakness/malaise and chills which began on Tuesday 8/22.  Pt found to be hypotensive and in ARF, started on IVF. Was seen by ENT and hem/onc. CT showed no recurrence of Dz but large thyroid nodule. Pt had episode of  neck pressure with brief difficulty breathing which resolved in few seconds. Pt's o2 sats  were monitored overnight and remained stable. Pt today states that feels back to himself. States that noted that neck pressure comes when he hyperextends neck.   Meds, d/c planning and f/u discussed       Vital Signs Last 24 Hrs  T(C): 36.7 (28 Aug 2017 11:34), Max: 36.9 (28 Aug 2017 10:04)  T(F): 98.1 (28 Aug 2017 11:34), Max: 98.5 (28 Aug 2017 10:04)  HR: 68 (28 Aug 2017 11:34) (68 - 91)  BP: 144/- (28 Aug 2017 11:34) (136/62 - 179/94)  BP(mean): 70 (28 Aug 2017 11:34) (70 - 70)  RR: 18 (28 Aug 2017 11:34) (18 - 18)  SpO2: 99% (28 Aug 2017 11:34) (98% - 99%)  REVIEW OF SYSTEMS:    CONSTITUTIONAL: No weakness, fevers or chills  EYES/ENT: No visual changes;  No vertigo, mild  throat pain.   NECK: episodic L sided neck pressure   RESPIRATORY: No cough, wheezing, hemoptysis; No shortness of breath  CARDIOVASCULAR: No chest pain or palpitations  GASTROINTESTINAL: No abdominal or epigastric pain. No nausea, vomiting, or hematemesis; No diarrhea or constipation. No melena or hematochezia.  GENITOURINARY: No dysuria, frequency or hematuria  NEUROLOGICAL: No numbness or weakness  SKIN: No itching, burning, rashes, or lesions   All other review of systems is negative unless indicated above.    PHYSICAL EXAM:  GENERAL: NAD  NERVOUS SYSTEM:  Alert & Oriented X3, non- focal exam  HEAD:  Atraumatic, Normocephalic  EYES: EOMI, PERRLA, conjunctiva and sclera clear  HEENT: moist  mucous membranes  NECK: enlarges, nodular thyroid, No JVD  CHEST/LUNG: Clear to auscultation bilaterally; No rales, no rhonchi, no wheezing, or rubs  HEART: Regular rate and rhythm; No murmurs, rubs, or gallops  ABDOMEN: Soft, Nontender, Nondistended; Bowel sounds present  GENITOURINARY- Voiding, no suprapubic tenderness  EXTREMITIES:  2+ Peripheral Pulses, No  edema  MUSCULOSKELETAL:- No muscle tenderness, Muscle tone normal, No joint tenderness, no Joint swelling, Joint range of motion-normal  SKIN-no rash, no lesion      PLAN:     # Generalized weakness/Malaise/Rigors with unclear  etiology  - unlikely infection did  not fit SIRS and no apparent source of acute infxn, ? adrenal insufficiency stable BP after IVF and cortisol pm WNL.   Possibly due to dehydration and low BP due to meds and poor oral intake   ~ serum Cortisol wnl  ~ TFTs wnl  ~CPK wNL  ~ENT consult appreciated, possibly throat pain due to GERD  ~s/p  Dexamethasone 4mg IV bolus x 1 on 8/25  ~ pt was on 5 days course of prednisone without improvement of the sore throat, pt refused PO prednisone   ~ CT neck -  Right thyroid nodule 6x5x5 cm with mass effect on trachea to the left  fullness in glottic region corresponding to CA  - Pulse ox stable, respiratory stable  - D/w DR Baldwin and DR Meadows on the phone will f/u outPt      # SUE, Dehydration resolved  - off  IV fluids, oral hydration   - Hold ACEI and diuretic  for now   - F/u with PCP     # Anemia, H/p B-Thalassemia   - iron studies, B12, folate wnl   - occult blood not sent, will need outPt   - Possibly due to thalassemia   - Repeat H/H this am with improvement  - hematology -oncology consult appreciated   - F/u with PCP for repeat labs and DR Cardoso for further evaluation     # HTN  with low BP on admission  - likely 2/2 dehydration ans BP meds   - S/p IVF, BP borderline  high now   - Cortisol WNL  - losartan 50/HCTz 12.5 - on hold  - terazosin decreased to 2mg  - F/u with PCP in 2-3 days for BP check   - BP improved, monitor     # BPH  - resume terazosin at 2mg     # GERD  ~ restarted on  PPI    # Throat CA, s/p Radiation TX   - CT neck reviewed, no signs of recurrence      # Thyroid nodule  with H/o Radiation to the neck   -  LArge nodule, will need f/u with Endo Dr Meadows and likely Bx    Dispo: d/c home today.    D/w PCP and Endo

## 2017-08-28 NOTE — DISCHARGE NOTE ADULT - PLAN OF CARE
resolve Hold BP meds, oral hydration control Hold BP meds, f/u with DR Baldwin for BP check in 2-3 days evaluate F/u with DR Meadows in 1-2 weeks monitor F/u with DR Baldwin and DR. Cardoso C/w Pantoprazole

## 2017-08-28 NOTE — DISCHARGE NOTE ADULT - CARE PLAN
Principal Discharge DX:	Weakness  Goal:	resolve  Instructions for follow-up, activity and diet:	Hold BP meds, oral hydration  Secondary Diagnosis:	Essential hypertension  Goal:	control  Instructions for follow-up, activity and diet:	Hold BP meds, f/u with DR Baldwin for BP check in 2-3 days  Secondary Diagnosis:	Thyroid nodule  Goal:	evaluate  Instructions for follow-up, activity and diet:	F/u with DR Meadows in 1-2 weeks  Secondary Diagnosis:	Anemia  Goal:	monitor  Instructions for follow-up, activity and diet:	F/u with DR Baldwin and DR. Cardoso  Secondary Diagnosis:	GERD (gastroesophageal reflux disease)  Goal:	control  Instructions for follow-up, activity and diet:	C/w Pantoprazole

## 2017-08-28 NOTE — DISCHARGE NOTE ADULT - MEDICATION SUMMARY - MEDICATIONS TO TAKE
I will START or STAY ON the medications listed below when I get home from the hospital:    terazosin 2 mg oral capsule  -- 1 cap(s) by mouth once a day (at bedtime)    Note for Pharmacist: Please disregard Rx for cardura   -- Indication: For BPH    fluticasone 50 mcg/inh nasal spray  -- 2 spray(s) into nose once a day  -- Indication: For Postnasal drip    sodium chloride 0.65% nasal spray  --  few drops each nosdril every 2-3 Hour as needed    -- Indication: For Postnasal drip    pantoprazole 40 mg oral delayed release tablet  -- 1 tab(s) by mouth 2 times a day (before meals)  -- Indication: For GERD    levothyroxine 25 mcg (0.025 mg) oral tablet  -- 1 tab(s) by mouth once a day  -- Indication: For Hypothyroidism, secondary I will START or STAY ON the medications listed below when I get home from the hospital:    terazosin 2 mg oral capsule  -- 1 cap(s) by mouth once a day (at bedtime)    Note for Pharmacist: Please disregard Rx for cardura   -- Indication: For BPH    fluticasone 50 mcg/inh nasal spray  -- 2 spray(s) into nose once a day  -- Indication: For POstanasal drip    sodium chloride 0.65% nasal spray  --  few drops each nosdril every 2-3 Hour as needed    -- Indication: For Postnasal drip    pantoprazole 40 mg oral delayed release tablet  -- 1 tab(s) by mouth 2 times a day (before meals)  -- Indication: For GERD (gastroesophageal reflux disease)    levothyroxine 25 mcg (0.025 mg) oral tablet  -- 1 tab(s) by mouth once a day  -- Indication: For Hypothyroidism, secondary

## 2017-08-28 NOTE — DISCHARGE NOTE ADULT - PATIENT PORTAL LINK FT
“You can access the [employer] Patient Portal, offered by [facility], by registering with the following website: [email or web address]”

## 2017-08-29 LAB
HEMOGLOBIN INTERPRETATION: SIGNIFICANT CHANGE UP
HGB A MFR BLD: 94.8 % — LOW (ref 95.8–98)
HGB A2 MFR BLD: 5.2 % — HIGH (ref 2–3.2)

## 2017-09-01 DIAGNOSIS — K21.9 GASTRO-ESOPHAGEAL REFLUX DISEASE WITHOUT ESOPHAGITIS: ICD-10-CM

## 2017-09-01 DIAGNOSIS — D56.9 THALASSEMIA, UNSPECIFIED: ICD-10-CM

## 2017-09-01 DIAGNOSIS — E86.0 DEHYDRATION: ICD-10-CM

## 2017-09-01 DIAGNOSIS — R53.1 WEAKNESS: ICD-10-CM

## 2017-09-01 DIAGNOSIS — I10 ESSENTIAL (PRIMARY) HYPERTENSION: ICD-10-CM

## 2017-09-01 DIAGNOSIS — Z88.0 ALLERGY STATUS TO PENICILLIN: ICD-10-CM

## 2017-09-01 DIAGNOSIS — Z87.891 PERSONAL HISTORY OF NICOTINE DEPENDENCE: ICD-10-CM

## 2017-09-01 DIAGNOSIS — I95.9 HYPOTENSION, UNSPECIFIED: ICD-10-CM

## 2017-09-01 DIAGNOSIS — D64.9 ANEMIA, UNSPECIFIED: ICD-10-CM

## 2017-09-01 DIAGNOSIS — N40.0 BENIGN PROSTATIC HYPERPLASIA WITHOUT LOWER URINARY TRACT SYMPTOMS: ICD-10-CM

## 2017-09-01 DIAGNOSIS — E89.0 POSTPROCEDURAL HYPOTHYROIDISM: ICD-10-CM

## 2017-09-03 LAB
CORTICOSTEROID BINDING GLOBULIN RESULT: 1.9 MG/DL — SIGNIFICANT CHANGE UP
CORTIS F/TOTAL MFR SERPL: 8.5 % — SIGNIFICANT CHANGE UP
CORTIS SERPL-MCNC: 10 UG/DL — SIGNIFICANT CHANGE UP
CORTISOL, FREE RESULT: 0.85 UG/DL — SIGNIFICANT CHANGE UP

## 2018-05-03 ENCOUNTER — RX RENEWAL (OUTPATIENT)
Age: 73
End: 2018-05-03

## 2018-05-03 DIAGNOSIS — J37.0 CHRONIC LARYNGITIS: ICD-10-CM

## 2018-09-25 PROBLEM — I10 ESSENTIAL (PRIMARY) HYPERTENSION: Chronic | Status: ACTIVE | Noted: 2017-08-25

## 2018-09-25 PROBLEM — H35.00 UNSPECIFIED BACKGROUND RETINOPATHY: Chronic | Status: ACTIVE | Noted: 2017-08-25

## 2018-09-25 PROBLEM — E03.8 OTHER SPECIFIED HYPOTHYROIDISM: Chronic | Status: ACTIVE | Noted: 2017-08-25

## 2018-09-28 ENCOUNTER — OUTPATIENT (OUTPATIENT)
Dept: OUTPATIENT SERVICES | Facility: HOSPITAL | Age: 73
LOS: 1 days | Discharge: HOME | End: 2018-09-28
Payer: MEDICARE

## 2018-09-28 DIAGNOSIS — Z41.9 ENCOUNTER FOR PROCEDURE FOR PURPOSES OTHER THAN REMEDYING HEALTH STATE, UNSPECIFIED: Chronic | ICD-10-CM

## 2018-09-28 DIAGNOSIS — Z90.49 ACQUIRED ABSENCE OF OTHER SPECIFIED PARTS OF DIGESTIVE TRACT: Chronic | ICD-10-CM

## 2018-09-28 DIAGNOSIS — Z86.010 PERSONAL HISTORY OF COLONIC POLYPS: ICD-10-CM

## 2018-09-28 LAB
ACANTHOCYTES BLD QL SMEAR: SLIGHT — SIGNIFICANT CHANGE UP
ANION GAP SERPL CALC-SCNC: 6 MMOL/L — SIGNIFICANT CHANGE UP (ref 5–17)
ANISOCYTOSIS BLD QL: SIGNIFICANT CHANGE UP
BASO STIPL BLD QL SMEAR: PRESENT — SIGNIFICANT CHANGE UP
BASOPHILS # BLD AUTO: 0.05 K/UL — SIGNIFICANT CHANGE UP (ref 0–0.2)
BASOPHILS NFR BLD AUTO: 0.9 % — SIGNIFICANT CHANGE UP (ref 0–2)
BITE CELLS BLD QL SMEAR: PRESENT — SIGNIFICANT CHANGE UP
BUN SERPL-MCNC: 16 MG/DL — SIGNIFICANT CHANGE UP (ref 7–23)
CALCIUM SERPL-MCNC: 9.1 MG/DL — SIGNIFICANT CHANGE UP (ref 8.5–10.1)
CHLORIDE SERPL-SCNC: 107 MMOL/L — SIGNIFICANT CHANGE UP (ref 96–108)
CO2 SERPL-SCNC: 25 MMOL/L — SIGNIFICANT CHANGE UP (ref 22–31)
CREAT SERPL-MCNC: 1.16 MG/DL — SIGNIFICANT CHANGE UP (ref 0.5–1.3)
DACRYOCYTES BLD QL SMEAR: SIGNIFICANT CHANGE UP
ELLIPTOCYTES BLD QL SMEAR: SLIGHT — SIGNIFICANT CHANGE UP
EOSINOPHIL # BLD AUTO: 0.13 K/UL — SIGNIFICANT CHANGE UP (ref 0–0.5)
EOSINOPHIL NFR BLD AUTO: 2.4 % — SIGNIFICANT CHANGE UP (ref 0–6)
GLUCOSE SERPL-MCNC: 109 MG/DL — HIGH (ref 70–99)
HCT VFR BLD CALC: 34.7 % — LOW (ref 39–50)
HGB BLD-MCNC: 10.9 G/DL — LOW (ref 13–17)
HYPOCHROMIA BLD QL: SIGNIFICANT CHANGE UP
IMM GRANULOCYTES NFR BLD AUTO: 0.2 % — SIGNIFICANT CHANGE UP (ref 0–1.5)
LYMPHOCYTES # BLD AUTO: 0.7 K/UL — LOW (ref 1–3.3)
LYMPHOCYTES # BLD AUTO: 13 % — SIGNIFICANT CHANGE UP (ref 13–44)
MACROCYTES BLD QL: SIGNIFICANT CHANGE UP
MANUAL SMEAR VERIFICATION: SIGNIFICANT CHANGE UP
MCHC RBC-ENTMCNC: 19.2 PG — LOW (ref 27–34)
MCHC RBC-ENTMCNC: 31.4 GM/DL — LOW (ref 32–36)
MCV RBC AUTO: 61.1 FL — LOW (ref 80–100)
MICROCYTES BLD QL: SIGNIFICANT CHANGE UP
MONOCYTES # BLD AUTO: 0.33 K/UL — SIGNIFICANT CHANGE UP (ref 0–0.9)
MONOCYTES NFR BLD AUTO: 6.1 % — SIGNIFICANT CHANGE UP (ref 2–14)
NEUTROPHILS # BLD AUTO: 4.17 K/UL — SIGNIFICANT CHANGE UP (ref 1.8–7.4)
NEUTROPHILS NFR BLD AUTO: 77.4 % — HIGH (ref 43–77)
NRBC # BLD: 0 /100 WBCS — SIGNIFICANT CHANGE UP (ref 0–0)
OVALOCYTES BLD QL SMEAR: SLIGHT — SIGNIFICANT CHANGE UP
PLAT MORPH BLD: NORMAL — SIGNIFICANT CHANGE UP
PLATELET # BLD AUTO: 142 K/UL — LOW (ref 150–400)
PLATELET COUNT - ESTIMATE: NORMAL — SIGNIFICANT CHANGE UP
POIKILOCYTOSIS BLD QL AUTO: SIGNIFICANT CHANGE UP
POLYCHROMASIA BLD QL SMEAR: SLIGHT — SIGNIFICANT CHANGE UP
POTASSIUM SERPL-MCNC: 4 MMOL/L — SIGNIFICANT CHANGE UP (ref 3.5–5.3)
POTASSIUM SERPL-SCNC: 4 MMOL/L — SIGNIFICANT CHANGE UP (ref 3.5–5.3)
RBC # BLD: 5.68 M/UL — SIGNIFICANT CHANGE UP (ref 4.2–5.8)
RBC # FLD: 19.4 % — HIGH (ref 10.3–14.5)
RBC BLD AUTO: ABNORMAL
SCHISTOCYTES BLD QL AUTO: SIGNIFICANT CHANGE UP
SODIUM SERPL-SCNC: 138 MMOL/L — SIGNIFICANT CHANGE UP (ref 135–145)
TARGETS BLD QL SMEAR: SLIGHT — SIGNIFICANT CHANGE UP
WBC # BLD: 5.39 K/UL — SIGNIFICANT CHANGE UP (ref 3.8–10.5)
WBC # FLD AUTO: 5.39 K/UL — SIGNIFICANT CHANGE UP (ref 3.8–10.5)

## 2018-09-28 PROCEDURE — 93010 ELECTROCARDIOGRAM REPORT: CPT

## 2018-09-28 RX ORDER — FLUTICASONE PROPIONATE 50 MCG
2 SPRAY, SUSPENSION NASAL
Qty: 0 | Refills: 0 | COMMUNITY

## 2018-09-28 NOTE — CHART NOTE - NSCHARTNOTEFT_GEN_A_CORE
T  BP  HR  RR  SpO2        73 year old male presents to [facility] for colonoscopy  1.  Dr Preciado office will instruct patient regarding bowel prep and  medication regimen on day of procedure.  2. Endoscopy booking will call patient the day before surgery for arrival instructions T 98.3 F  /82  HR 90  RR  16  SpO2 100% RA        73 year old male presents to [facility] for colonoscopy  1.  Dr Preciado office will instruct patient regarding bowel prep and  medication regimen on day of procedure.  2. Endoscopy booking will call patient the day before surgery for arrival instructions

## 2018-09-28 NOTE — ASU PATIENT PROFILE, ADULT - PMH
Adenoma  colon  BPH (benign prostatic hyperplasia)    Essential hypertension    GERD (gastroesophageal reflux disease)    [place] (hard of hearing)    Hypothyroidism, secondary    Prostatitis    Retinopathy of right eye    Spinal stenosis of lumbar region    Thalassemia major    Throat cancer  radiation x 6 weeks 2003

## 2018-09-29 PROBLEM — C14.0 MALIGNANT NEOPLASM OF PHARYNX, UNSPECIFIED: Chronic | Status: ACTIVE | Noted: 2017-08-25

## 2018-10-02 ENCOUNTER — OUTPATIENT (OUTPATIENT)
Dept: OUTPATIENT SERVICES | Facility: HOSPITAL | Age: 73
LOS: 1 days | Discharge: HOME | End: 2018-10-02

## 2018-10-02 VITALS
DIASTOLIC BLOOD PRESSURE: 90 MMHG | HEIGHT: 67 IN | TEMPERATURE: 97 F | SYSTOLIC BLOOD PRESSURE: 136 MMHG | OXYGEN SATURATION: 98 % | RESPIRATION RATE: 14 BRPM | HEART RATE: 98 BPM | WEIGHT: 190.04 LBS

## 2018-10-02 DIAGNOSIS — Z90.49 ACQUIRED ABSENCE OF OTHER SPECIFIED PARTS OF DIGESTIVE TRACT: Chronic | ICD-10-CM

## 2018-10-02 DIAGNOSIS — Z41.9 ENCOUNTER FOR PROCEDURE FOR PURPOSES OTHER THAN REMEDYING HEALTH STATE, UNSPECIFIED: Chronic | ICD-10-CM

## 2018-10-02 NOTE — ASU PATIENT PROFILE, ADULT - AS SC BRADEN MOBILITY
restless,agitated ealier per RN,only recently settled down,appears somnolent/impaired
(4) no limitation

## 2018-10-02 NOTE — ASU PATIENT PROFILE, ADULT - ABILITY TO HEAR (WITH HEARING AID OR HEARING APPLIANCE IF NORMALLY USED):
Mildly to Moderately Impaired: difficulty hearing in some environments or speaker may need to increase volume or speak distinctly/hearing aids bilaterally

## 2018-10-02 NOTE — ASU PATIENT PROFILE, ADULT - PMH
light touch intact throughout/Grossly Intact
Adenoma  colon  BPH (benign prostatic hyperplasia)    Essential hypertension    GERD (gastroesophageal reflux disease)    [place] (hard of hearing)    Hypothyroidism, secondary    Prostatitis    Retinopathy of right eye    Spinal stenosis of lumbar region    Thalassemia major    Throat cancer  radiation x 6 weeks 2003

## 2018-10-05 DIAGNOSIS — K21.9 GASTRO-ESOPHAGEAL REFLUX DISEASE WITHOUT ESOPHAGITIS: ICD-10-CM

## 2018-10-05 DIAGNOSIS — K64.8 OTHER HEMORRHOIDS: ICD-10-CM

## 2018-10-05 DIAGNOSIS — Z12.11 ENCOUNTER FOR SCREENING FOR MALIGNANT NEOPLASM OF COLON: ICD-10-CM

## 2018-10-05 DIAGNOSIS — E03.8 OTHER SPECIFIED HYPOTHYROIDISM: ICD-10-CM

## 2018-10-05 DIAGNOSIS — Z85.01 PERSONAL HISTORY OF MALIGNANT NEOPLASM OF ESOPHAGUS: ICD-10-CM

## 2018-10-05 DIAGNOSIS — I10 ESSENTIAL (PRIMARY) HYPERTENSION: ICD-10-CM

## 2018-10-05 DIAGNOSIS — Z86.010 PERSONAL HISTORY OF COLONIC POLYPS: ICD-10-CM

## 2018-10-05 DIAGNOSIS — M48.061 SPINAL STENOSIS, LUMBAR REGION WITHOUT NEUROGENIC CLAUDICATION: ICD-10-CM

## 2018-10-05 DIAGNOSIS — N40.0 BENIGN PROSTATIC HYPERPLASIA WITHOUT LOWER URINARY TRACT SYMPTOMS: ICD-10-CM

## 2018-10-05 DIAGNOSIS — N41.9 INFLAMMATORY DISEASE OF PROSTATE, UNSPECIFIED: ICD-10-CM

## 2018-11-27 PROBLEM — D36.9 BENIGN NEOPLASM, UNSPECIFIED SITE: Chronic | Status: ACTIVE | Noted: 2018-09-28

## 2018-11-27 PROBLEM — N40.0 BENIGN PROSTATIC HYPERPLASIA WITHOUT LOWER URINARY TRACT SYMPTOMS: Chronic | Status: ACTIVE | Noted: 2018-09-28

## 2018-11-27 PROBLEM — K21.9 GASTRO-ESOPHAGEAL REFLUX DISEASE WITHOUT ESOPHAGITIS: Chronic | Status: ACTIVE | Noted: 2018-09-28

## 2018-11-27 PROBLEM — N41.9 INFLAMMATORY DISEASE OF PROSTATE, UNSPECIFIED: Chronic | Status: ACTIVE | Noted: 2018-09-28

## 2018-11-27 PROBLEM — M48.061 SPINAL STENOSIS, LUMBAR REGION WITHOUT NEUROGENIC CLAUDICATION: Chronic | Status: ACTIVE | Noted: 2018-09-28

## 2018-11-29 ENCOUNTER — OUTPATIENT (OUTPATIENT)
Dept: OUTPATIENT SERVICES | Facility: HOSPITAL | Age: 73
LOS: 1 days | Discharge: HOME | End: 2018-11-29

## 2018-11-29 DIAGNOSIS — Z90.49 ACQUIRED ABSENCE OF OTHER SPECIFIED PARTS OF DIGESTIVE TRACT: Chronic | ICD-10-CM

## 2018-11-29 DIAGNOSIS — N40.1 BENIGN PROSTATIC HYPERPLASIA WITH LOWER URINARY TRACT SYMPTOMS: ICD-10-CM

## 2018-11-29 DIAGNOSIS — Z98.890 OTHER SPECIFIED POSTPROCEDURAL STATES: Chronic | ICD-10-CM

## 2018-11-29 DIAGNOSIS — Z41.9 ENCOUNTER FOR PROCEDURE FOR PURPOSES OTHER THAN REMEDYING HEALTH STATE, UNSPECIFIED: Chronic | ICD-10-CM

## 2018-11-29 LAB
ANION GAP SERPL CALC-SCNC: 8 MMOL/L — SIGNIFICANT CHANGE UP (ref 5–17)
ANISOCYTOSIS BLD QL: SIGNIFICANT CHANGE UP
APPEARANCE UR: CLEAR — SIGNIFICANT CHANGE UP
BASOPHILS # BLD AUTO: 0 K/UL — SIGNIFICANT CHANGE UP (ref 0–0.2)
BASOPHILS NFR BLD AUTO: 0 % — SIGNIFICANT CHANGE UP (ref 0–2)
BILIRUB UR-MCNC: NEGATIVE — SIGNIFICANT CHANGE UP
BUN SERPL-MCNC: 17 MG/DL — SIGNIFICANT CHANGE UP (ref 7–23)
CALCIUM SERPL-MCNC: 8.9 MG/DL — SIGNIFICANT CHANGE UP (ref 8.5–10.1)
CHLORIDE SERPL-SCNC: 105 MMOL/L — SIGNIFICANT CHANGE UP (ref 96–108)
CO2 SERPL-SCNC: 24 MMOL/L — SIGNIFICANT CHANGE UP (ref 22–31)
COLOR SPEC: YELLOW — SIGNIFICANT CHANGE UP
CREAT SERPL-MCNC: 1.21 MG/DL — SIGNIFICANT CHANGE UP (ref 0.5–1.3)
DIFF PNL FLD: ABNORMAL
ELLIPTOCYTES BLD QL SMEAR: SIGNIFICANT CHANGE UP
EOSINOPHIL # BLD AUTO: 0.06 K/UL — SIGNIFICANT CHANGE UP (ref 0–0.5)
EOSINOPHIL NFR BLD AUTO: 1 % — SIGNIFICANT CHANGE UP (ref 0–6)
EPI CELLS # UR: SIGNIFICANT CHANGE UP
GLUCOSE SERPL-MCNC: 104 MG/DL — HIGH (ref 70–99)
GLUCOSE UR QL: NEGATIVE MG/DL — SIGNIFICANT CHANGE UP
HCT VFR BLD CALC: 34.2 % — LOW (ref 39–50)
HGB BLD-MCNC: 10.6 G/DL — LOW (ref 13–17)
KETONES UR-MCNC: NEGATIVE — SIGNIFICANT CHANGE UP
LEUKOCYTE ESTERASE UR-ACNC: ABNORMAL
LYMPHOCYTES # BLD AUTO: 0.67 K/UL — LOW (ref 1–3.3)
LYMPHOCYTES # BLD AUTO: 11 % — LOW (ref 13–44)
MACROCYTES BLD QL: SIGNIFICANT CHANGE UP
MANUAL SMEAR VERIFICATION: SIGNIFICANT CHANGE UP
MCHC RBC-ENTMCNC: 18.7 PG — LOW (ref 27–34)
MCHC RBC-ENTMCNC: 31 GM/DL — LOW (ref 32–36)
MCV RBC AUTO: 60.4 FL — LOW (ref 80–100)
MICROCYTES BLD QL: SLIGHT — SIGNIFICANT CHANGE UP
MONOCYTES # BLD AUTO: 0.18 K/UL — SIGNIFICANT CHANGE UP (ref 0–0.9)
MONOCYTES NFR BLD AUTO: 3 % — SIGNIFICANT CHANGE UP (ref 2–14)
NEUTROPHILS # BLD AUTO: 5.15 K/UL — SIGNIFICANT CHANGE UP (ref 1.8–7.4)
NEUTROPHILS NFR BLD AUTO: 84 % — HIGH (ref 43–77)
NITRITE UR-MCNC: NEGATIVE — SIGNIFICANT CHANGE UP
NRBC # BLD: 0 /100 — SIGNIFICANT CHANGE UP (ref 0–0)
NRBC # BLD: SIGNIFICANT CHANGE UP /100 WBCS (ref 0–0)
PH UR: 6 — SIGNIFICANT CHANGE UP (ref 5–8)
PLAT MORPH BLD: NORMAL — SIGNIFICANT CHANGE UP
PLATELET # BLD AUTO: 153 K/UL — SIGNIFICANT CHANGE UP (ref 150–400)
POIKILOCYTOSIS BLD QL AUTO: SIGNIFICANT CHANGE UP
POLYCHROMASIA BLD QL SMEAR: SLIGHT — SIGNIFICANT CHANGE UP
POTASSIUM SERPL-MCNC: 3.9 MMOL/L — SIGNIFICANT CHANGE UP (ref 3.5–5.3)
POTASSIUM SERPL-SCNC: 3.9 MMOL/L — SIGNIFICANT CHANGE UP (ref 3.5–5.3)
PROT UR-MCNC: NEGATIVE MG/DL — SIGNIFICANT CHANGE UP
RBC # BLD: 5.66 M/UL — SIGNIFICANT CHANGE UP (ref 4.2–5.8)
RBC # FLD: 19.9 % — HIGH (ref 10.3–14.5)
RBC BLD AUTO: ABNORMAL
RBC CASTS # UR COMP ASSIST: SIGNIFICANT CHANGE UP /HPF (ref 0–4)
SODIUM SERPL-SCNC: 137 MMOL/L — SIGNIFICANT CHANGE UP (ref 135–145)
SP GR SPEC: 1.01 — SIGNIFICANT CHANGE UP (ref 1.01–1.02)
UROBILINOGEN FLD QL: NEGATIVE MG/DL — SIGNIFICANT CHANGE UP
VARIANT LYMPHS # BLD: 1 % — SIGNIFICANT CHANGE UP (ref 0–6)
WBC # BLD: 6.13 K/UL — SIGNIFICANT CHANGE UP (ref 3.8–10.5)
WBC # FLD AUTO: 6.13 K/UL — SIGNIFICANT CHANGE UP (ref 3.8–10.5)
WBC UR QL: SIGNIFICANT CHANGE UP

## 2018-11-29 NOTE — CHART NOTE - NSCHARTNOTEFT_GEN_A_CORE
Plan  1. Stop all NSAIDS, herbal supplements and vitamins for 7 days.  2. NPO at midnight.  3. Take the following medications ( levothyroxine, prilosec ) with small sips of water on the morning of your procedure/surgery.  4. Labs as per surgeon. EKG on chart  5. PMD visit for optimization prior to surgery as per surgeon

## 2018-11-29 NOTE — ASU PATIENT PROFILE, ADULT - PMH
Adenoma  colon  BPH (benign prostatic hyperplasia)    Essential hypertension    GERD (gastroesophageal reflux disease)    Hearing aid worn  b/l  Hypothyroidism, secondary    Obesity    Prostatitis    Retinopathy of right eye    Spinal stenosis of lumbar region    Thalassemia minor    Throat cancer  radiation x 6 weeks 2003

## 2018-11-30 PROBLEM — H91.90 UNSPECIFIED HEARING LOSS, UNSPECIFIED EAR: Chronic | Status: INACTIVE | Noted: 2018-09-28 | Resolved: 2018-11-29

## 2018-11-30 PROBLEM — D56.1 BETA THALASSEMIA: Chronic | Status: INACTIVE | Noted: 2017-08-25 | Resolved: 2018-11-29

## 2018-12-06 ENCOUNTER — RESULT REVIEW (OUTPATIENT)
Age: 73
End: 2018-12-06

## 2018-12-06 ENCOUNTER — OUTPATIENT (OUTPATIENT)
Dept: OUTPATIENT SERVICES | Facility: HOSPITAL | Age: 73
LOS: 1 days | Discharge: HOME | End: 2018-12-06
Payer: MEDICARE

## 2018-12-06 VITALS
RESPIRATION RATE: 16 BRPM | SYSTOLIC BLOOD PRESSURE: 149 MMHG | DIASTOLIC BLOOD PRESSURE: 68 MMHG | HEART RATE: 84 BPM | OXYGEN SATURATION: 99 %

## 2018-12-06 VITALS
SYSTOLIC BLOOD PRESSURE: 164 MMHG | TEMPERATURE: 98 F | DIASTOLIC BLOOD PRESSURE: 87 MMHG | HEART RATE: 95 BPM | RESPIRATION RATE: 16 BRPM | WEIGHT: 187.39 LBS | OXYGEN SATURATION: 100 % | HEIGHT: 67 IN

## 2018-12-06 DIAGNOSIS — Z90.49 ACQUIRED ABSENCE OF OTHER SPECIFIED PARTS OF DIGESTIVE TRACT: Chronic | ICD-10-CM

## 2018-12-06 DIAGNOSIS — Z98.890 OTHER SPECIFIED POSTPROCEDURAL STATES: Chronic | ICD-10-CM

## 2018-12-06 DIAGNOSIS — Z41.9 ENCOUNTER FOR PROCEDURE FOR PURPOSES OTHER THAN REMEDYING HEALTH STATE, UNSPECIFIED: Chronic | ICD-10-CM

## 2018-12-06 PROCEDURE — 88305 TISSUE EXAM BY PATHOLOGIST: CPT | Mod: 26

## 2018-12-06 RX ORDER — OMEPRAZOLE 10 MG/1
1 CAPSULE, DELAYED RELEASE ORAL
Qty: 0 | Refills: 0 | COMMUNITY

## 2018-12-06 RX ORDER — FENTANYL CITRATE 50 UG/ML
50 INJECTION INTRAVENOUS
Qty: 0 | Refills: 0 | Status: DISCONTINUED | OUTPATIENT
Start: 2018-12-06 | End: 2018-12-06

## 2018-12-06 RX ORDER — TERAZOSIN HYDROCHLORIDE 10 MG/1
1 CAPSULE ORAL
Qty: 0 | Refills: 0 | COMMUNITY

## 2018-12-06 RX ORDER — KETOROLAC TROMETHAMINE 30 MG/ML
30 SYRINGE (ML) INJECTION EVERY 6 HOURS
Qty: 0 | Refills: 0 | Status: DISCONTINUED | OUTPATIENT
Start: 2018-12-06 | End: 2018-12-06

## 2018-12-06 RX ORDER — ONDANSETRON 8 MG/1
4 TABLET, FILM COATED ORAL ONCE
Qty: 0 | Refills: 0 | Status: COMPLETED | OUTPATIENT
Start: 2018-12-06 | End: 2018-12-06

## 2018-12-06 RX ORDER — LEVOTHYROXINE SODIUM 125 MCG
1 TABLET ORAL
Qty: 0 | Refills: 0 | COMMUNITY

## 2018-12-06 RX ORDER — OXYBUTYNIN CHLORIDE 5 MG
5 TABLET ORAL
Qty: 0 | Refills: 0 | Status: DISCONTINUED | OUTPATIENT
Start: 2018-12-06 | End: 2018-12-21

## 2018-12-06 RX ORDER — SODIUM CHLORIDE 9 MG/ML
1000 INJECTION, SOLUTION INTRAVENOUS
Qty: 0 | Refills: 0 | Status: DISCONTINUED | OUTPATIENT
Start: 2018-12-06 | End: 2018-12-06

## 2018-12-06 RX ORDER — ACETAMINOPHEN 500 MG
1000 TABLET ORAL ONCE
Qty: 0 | Refills: 0 | Status: COMPLETED | OUTPATIENT
Start: 2018-12-06 | End: 2018-12-06

## 2018-12-06 RX ADMIN — ONDANSETRON 4 MILLIGRAM(S): 8 TABLET, FILM COATED ORAL at 10:50

## 2018-12-06 RX ADMIN — Medication 400 MILLIGRAM(S): at 10:22

## 2018-12-06 RX ADMIN — Medication 30 MILLIGRAM(S): at 10:51

## 2018-12-06 RX ADMIN — FENTANYL CITRATE 50 MICROGRAM(S): 50 INJECTION INTRAVENOUS at 10:06

## 2018-12-06 RX ADMIN — FENTANYL CITRATE 50 MICROGRAM(S): 50 INJECTION INTRAVENOUS at 10:51

## 2018-12-06 RX ADMIN — SODIUM CHLORIDE 75 MILLILITER(S): 9 INJECTION, SOLUTION INTRAVENOUS at 09:57

## 2018-12-06 RX ADMIN — FENTANYL CITRATE 50 MICROGRAM(S): 50 INJECTION INTRAVENOUS at 10:33

## 2018-12-06 RX ADMIN — Medication 1000 MILLIGRAM(S): at 10:51

## 2018-12-06 RX ADMIN — Medication 30 MILLIGRAM(S): at 10:21

## 2018-12-06 RX ADMIN — Medication 5 MILLIGRAM(S): at 09:59

## 2018-12-06 RX ADMIN — FENTANYL CITRATE 50 MICROGRAM(S): 50 INJECTION INTRAVENOUS at 09:56

## 2018-12-06 NOTE — ASU PATIENT PROFILE, ADULT - ABILITY TO HEAR (WITH HEARING AID OR HEARING APPLIANCE IF NORMALLY USED):
b/l hearing aid with wife/Mildly to Moderately Impaired: difficulty hearing in some environments or speaker may need to increase volume or speak distinctly

## 2018-12-06 NOTE — ASU PATIENT PROFILE, ADULT - VISION (WITH CORRECTIVE LENSES IF THE PATIENT USUALLY WEARS THEM):
reading glasses  with wife/Normal vision: sees adequately in most situations; can see medication labels, newsprint

## 2018-12-06 NOTE — BRIEF OPERATIVE NOTE - PRE-OP DX
BPH with obstruction/lower urinary tract symptoms  12/06/2018    Active  Dante Buitrago  Chronic prostatitis  12/06/2018    Active  Dante Buitrago

## 2018-12-06 NOTE — BRIEF OPERATIVE NOTE - PROCEDURE
<<-----Click on this checkbox to enter Procedure TURP (transurethral resection of prostate)  12/06/2018    Active  SHAHRAM

## 2018-12-11 LAB — SURGICAL PATHOLOGY FINAL REPORT - CH: SIGNIFICANT CHANGE UP

## 2018-12-12 DIAGNOSIS — R39.12 POOR URINARY STREAM: ICD-10-CM

## 2018-12-12 DIAGNOSIS — R33.9 RETENTION OF URINE, UNSPECIFIED: ICD-10-CM

## 2018-12-12 DIAGNOSIS — Z88.0 ALLERGY STATUS TO PENICILLIN: ICD-10-CM

## 2018-12-12 DIAGNOSIS — Z85.819 PERSONAL HISTORY OF MALIGNANT NEOPLASM OF UNSPECIFIED SITE OF LIP, ORAL CAVITY, AND PHARYNX: ICD-10-CM

## 2018-12-12 DIAGNOSIS — Z88.2 ALLERGY STATUS TO SULFONAMIDES: ICD-10-CM

## 2018-12-12 DIAGNOSIS — K21.9 GASTRO-ESOPHAGEAL REFLUX DISEASE WITHOUT ESOPHAGITIS: ICD-10-CM

## 2018-12-12 DIAGNOSIS — Z87.891 PERSONAL HISTORY OF NICOTINE DEPENDENCE: ICD-10-CM

## 2018-12-12 DIAGNOSIS — R35.1 NOCTURIA: ICD-10-CM

## 2018-12-12 DIAGNOSIS — R39.11 HESITANCY OF MICTURITION: ICD-10-CM

## 2018-12-12 DIAGNOSIS — N40.1 BENIGN PROSTATIC HYPERPLASIA WITH LOWER URINARY TRACT SYMPTOMS: ICD-10-CM

## 2018-12-12 DIAGNOSIS — N41.9 INFLAMMATORY DISEASE OF PROSTATE, UNSPECIFIED: ICD-10-CM

## 2018-12-12 DIAGNOSIS — I10 ESSENTIAL (PRIMARY) HYPERTENSION: ICD-10-CM

## 2018-12-12 DIAGNOSIS — Z92.3 PERSONAL HISTORY OF IRRADIATION: ICD-10-CM

## 2019-04-12 NOTE — DISCHARGE NOTE ADULT - MEDICATION SUMMARY - MEDICATIONS TO CHANGE
We can go up to 100mg daily on her topomax for now and suggest her seeing neurology for this    I will SWITCH the dose or number of times a day I take the medications listed below when I get home from the hospital:    terazosin 10 mg oral capsule  -- 1 cap(s) by mouth once a day (at bedtime)

## 2019-10-15 NOTE — ASU PATIENT PROFILE, ADULT - NS PRO PT RIGHT SUPPORT PERSON
Physical Therapy  10/15/2019    Chart reviewed. Note patient is POD 1 from [facility]. Attempted PT re-evaluation, however patient declined any participate due to the \"amount of pain\" she's in. Rated 10/10 when asked. Surgeon present and stated plan to start IV fentanyl. Plan to f/u later today after pain management improved for PT re-evaluation. Encouraged patient to participate in all self care, including feeding, as able. Note daughter present at bedside preparing patient's lunch tray. Discussed this with student nurse as well. Thank you.     Sarina Crystal, PT, DPT Declines

## 2019-11-02 NOTE — ED PROVIDER NOTE - ENMT, MLM
Yes
Airway patent, Nasal mucosa clear. Mouth with dry mucosa. Throat has no vesicles, no oropharyngeal exudates and uvula is midline.

## 2020-10-27 ENCOUNTER — EMERGENCY (EMERGENCY)
Facility: HOSPITAL | Age: 75
LOS: 0 days | Discharge: HOME | End: 2020-10-27
Payer: MEDICARE

## 2020-10-27 VITALS
HEIGHT: 67 IN | DIASTOLIC BLOOD PRESSURE: 64 MMHG | SYSTOLIC BLOOD PRESSURE: 139 MMHG | HEART RATE: 76 BPM | TEMPERATURE: 98 F | RESPIRATION RATE: 17 BRPM | OXYGEN SATURATION: 97 %

## 2020-10-27 DIAGNOSIS — Z88.1 ALLERGY STATUS TO OTHER ANTIBIOTIC AGENTS STATUS: ICD-10-CM

## 2020-10-27 DIAGNOSIS — J32.9 CHRONIC SINUSITIS, UNSPECIFIED: ICD-10-CM

## 2020-10-27 DIAGNOSIS — Z20.828 CONTACT WITH AND (SUSPECTED) EXPOSURE TO OTHER VIRAL COMMUNICABLE DISEASES: ICD-10-CM

## 2020-10-27 DIAGNOSIS — Z88.0 ALLERGY STATUS TO PENICILLIN: ICD-10-CM

## 2020-10-27 DIAGNOSIS — Z91.013 ALLERGY TO SEAFOOD: ICD-10-CM

## 2020-10-27 DIAGNOSIS — Z88.2 ALLERGY STATUS TO SULFONAMIDES: ICD-10-CM

## 2020-10-27 DIAGNOSIS — Z41.9 ENCOUNTER FOR PROCEDURE FOR PURPOSES OTHER THAN REMEDYING HEALTH STATE, UNSPECIFIED: Chronic | ICD-10-CM

## 2020-10-27 DIAGNOSIS — Z88.5 ALLERGY STATUS TO NARCOTIC AGENT: ICD-10-CM

## 2020-10-27 DIAGNOSIS — Z98.890 OTHER SPECIFIED POSTPROCEDURAL STATES: Chronic | ICD-10-CM

## 2020-10-27 DIAGNOSIS — Z90.49 ACQUIRED ABSENCE OF OTHER SPECIFIED PARTS OF DIGESTIVE TRACT: Chronic | ICD-10-CM

## 2020-10-27 PROBLEM — D56.3 THALASSEMIA MINOR: Chronic | Status: ACTIVE | Noted: 2018-11-29

## 2020-10-27 PROBLEM — Z97.4 PRESENCE OF EXTERNAL HEARING-AID: Chronic | Status: ACTIVE | Noted: 2018-11-29

## 2020-10-27 PROBLEM — E66.9 OBESITY, UNSPECIFIED: Chronic | Status: ACTIVE | Noted: 2018-11-29

## 2020-10-27 PROCEDURE — 99283 EMERGENCY DEPT VISIT LOW MDM: CPT

## 2020-10-27 PROCEDURE — U0003: CPT

## 2020-10-27 PROCEDURE — 99283 EMERGENCY DEPT VISIT LOW MDM: CPT | Mod: CS

## 2020-10-27 PROCEDURE — 99282 EMERGENCY DEPT VISIT SF MDM: CPT

## 2020-10-27 NOTE — ED ADULT TRIAGE NOTE - CHIEF COMPLAINT QUOTE
Patient comes to ED for COVID swab, pt reports increase in use of albuterol inhaler. denies any COVID exposure   Patient provides verbal consent to receive results via text or email.

## 2020-10-27 NOTE — ED STATDOCS - OBJECTIVE STATEMENT
He presents reporting he is being treated for sinusitis secondary to congestion and pressure for many days but his doctor also wanted him checked for COVID19.  Denies fever, chills, cough.

## 2020-10-27 NOTE — ED STATDOCS - NSFOLLOWUPINSTRUCTIONS_ED_ALL_ED_FT
How to get your Coronavirus (COVID-19) Testing Results:   Please be advised that you were tested for the coronavirus (COVID-19) in the Emergency Department at [facility].  You are to maintain self-quarantine procedures for 14 days until instructed otherwise by one of our healthcare agents. Please note that the test may take up to 2-4 days to result.  If you do not hear from us within 72 hours and you'd like to check on your results, you can call on of our coronavirus specialists at [address] [place] [address] (available 24/7).  Please DO NOT call the site where you received the test to obtain your results.

## 2020-10-27 NOTE — ED STATDOCS - PATIENT PORTAL LINK FT
You can access the FollowMyHealth Patient Portal offered by [facility] by registering at the following website: [email or web address]. By joining [facility]’s FollowMyHealth portal, you will also be able to view your health information using other applications (apps) compatible with our system.

## 2020-10-27 NOTE — ED ADULT NURSE NOTE - OBJECTIVE STATEMENT
Patient comes to ED for COVID swab, pt reports increase in use of albuterol inhaler. denies any COVID exposure   Patient provides verbal consent to receive results via text or email.  Patient evaluated, treated, and discharged by PA. Refer to PA note for assessment.

## 2020-10-28 LAB — SARS-COV-2 RNA SPEC QL NAA+PROBE: SIGNIFICANT CHANGE UP

## 2021-06-07 NOTE — ED CLERICAL - DIVISION
Home Care Instructions after your Radiofrequency Ablation    · You may remove the bandages after several hours.  · Rest as much as you need to.  Go back to your normal activities as tolerated.  · Expect some bruising and soreness around the procedure site for about 5 to 7 days.  · It is very important to know that at first you may feel a warm sensation in the affected area.  This is normal after the procedure, and it may last for several months. Please keep this in mind when planning your activities after the procedure.  · Most patients have a significant decrease in their pain around three weeks after the procedure. Pain relief can last from three months to two years or longer.  If the original pain returns, the procedure can be repeated.    Call Us if:    · You have abnormal bleeding, persistent chills or a fever over 100 degrees F.  · The site appears red, swollen and warm to the touch, or has any kind of drainage.       If you stopped taking your blood thinning medicine, check with your doctor about taking this again.    In case of emergency, call your doctor.  If you cannot reach a doctor, go to the nearest emergency room and ask them to call your doctor.     Care After Anesthesia or Sedation    After discharge  • Due to the medicine given, someone must drive you home. It is strongly recommended to have someone stay with you at home the day of discharge and the night after surgery.  • If you have infants or small children at home, please have someone help you for at least 24 hours after your surgery.  • Do not drive for at least 24 hours after surgery (or as told by your doctor).  • Rest for the remainder of the day. Go up and down stairs slowly.  • Do not smoke after surgery. Smoking can delay healing.  • Do not operate heavy or potential harmful equipment.  • Do not make legally binding decisions.  • Do not drink alcohol for 24 hours.    Diet  • Nausea may be expected for the first 24 to 48 hours. Start  [place]... eating a bland diet (toast, gelatin, 7-up, hot cereal, crackers, sherbet, broth soup).  • Drink plenty of fluids (6 to 8 glasses of water a day).  • Resume your regular diet as able.  • Avoid greasy or spicy foods for 24 hours.    Urination  • The effects of anesthetics may cause some people to have trouble passing urine the day of surgery. Drink a lot of fluids to help prevent this.  • Try to urinate within 8 hours of surgery.  • If you are unable to pass urine and feel like you need to, call your doctor or the hospital.    Pain control  • Some incisions are injected with a long acting local anesthetic; it will wear off in 4 to 6 hours. You can expect to have some pain at this time.  • Treat your pain with the prescribed pain medicine before it wears off. Do not wait until your pain becomes severe.  • Ask your nurse when you had your last pain medicine, so you know when you can take another one after you get home.      Call your doctor if you have:  • Nausea and vomiting that does not stop  • Fever over 101° F  • Pain not relieved by pain medication  • If you are unable to pass your urine or you have not passed urine in the last 8 hours.  • Unusual changes in behavior  • Dizziness  • Hives  If you are not able to reach your doctor, you may call the emergency department.

## 2021-06-08 NOTE — ED PROVIDER NOTE - NSCAREINITIATED _GEN_ER
Merrill Pacheco(Attending)
Detail Level: Detailed
Detail Level: Simple
Tazorac Pregnancy And Lactation Text: This medication is not safe during pregnancy. It is unknown if this medication is excreted in breast milk.
Benzoyl Peroxide Pregnancy And Lactation Text: This medication is Pregnancy Category C. It is unknown if benzoyl peroxide is excreted in breast milk.
Spironolactone Pregnancy And Lactation Text: This medication can cause feminization of the male fetus and should be avoided during pregnancy. The active metabolite is also found in breast milk.
Minocycline Pregnancy And Lactation Text: This medication is Pregnancy Category D and not consider safe during pregnancy. It is also excreted in breast milk.
Azithromycin Pregnancy And Lactation Text: This medication is considered safe during pregnancy and is also secreted in breast milk.
Isotretinoin Pregnancy And Lactation Text: This medication is Pregnancy Category X and is considered extremely dangerous during pregnancy. It is unknown if it is excreted in breast milk.
Doxycycline Pregnancy And Lactation Text: This medication is Pregnancy Category D and not consider safe during pregnancy. It is also excreted in breast milk but is considered safe for shorter treatment courses.
Topical Retinoid counseling:  Patient advised to apply a pea-sized amount only at bedtime and wait 30 minutes after washing their face before applying.  If too drying, patient may add a non-comedogenic moisturizer. The patient verbalized understanding of the proper use and possible adverse effects of retinoids.  All of the patient's questions and concerns were addressed.
Birth Control Pills Pregnancy And Lactation Text: This medication should be avoided if pregnant and for the first 30 days post-partum.
Tetracycline Counseling: Patient counseled regarding possible photosensitivity and increased risk for sunburn.  Patient instructed to avoid sunlight, if possible.  When exposed to sunlight, patients should wear protective clothing, sunglasses, and sunscreen.  The patient was instructed to call the office immediately if the following severe adverse effects occur:  hearing changes, easy bruising/bleeding, severe headache, or vision changes.  The patient verbalized understanding of the proper use and possible adverse effects of tetracycline.  All of the patient's questions and concerns were addressed. Patient understands to avoid pregnancy while on therapy due to potential birth defects.
Sarecycline Counseling: Patient advised regarding possible photosensitivity and discoloration of the teeth, skin, lips, tongue and gums.  Patient instructed to avoid sunlight, if possible.  When exposed to sunlight, patients should wear protective clothing, sunglasses, and sunscreen.  The patient was instructed to call the office immediately if the following severe adverse effects occur:  hearing changes, easy bruising/bleeding, severe headache, or vision changes.  The patient verbalized understanding of the proper use and possible adverse effects of sarecycline.  All of the patient's questions and concerns were addressed.
Topical Clindamycin Counseling: Patient counseled that this medication may cause skin irritation or allergic reactions.  In the event of skin irritation, the patient was advised to reduce the amount of the drug applied or use it less frequently.   The patient verbalized understanding of the proper use and possible adverse effects of clindamycin.  All of the patient's questions and concerns were addressed.
High Dose Vitamin A Counseling: Side effects reviewed, pt to contact office should one occur.
Erythromycin Counseling:  I discussed with the patient the risks of erythromycin including but not limited to GI upset, allergic reaction, drug rash, diarrhea, increase in liver enzymes, and yeast infections.
Dapsone Counseling: I discussed with the patient the risks of dapsone including but not limited to hemolytic anemia, agranulocytosis, rashes, methemoglobinemia, kidney failure, peripheral neuropathy, headaches, GI upset, and liver toxicity.  Patients who start dapsone require monitoring including baseline LFTs and weekly CBCs for the first month, then every month thereafter.  The patient verbalized understanding of the proper use and possible adverse effects of dapsone.  All of the patient's questions and concerns were addressed.
Bactrim Counseling:  I discussed with the patient the risks of sulfa antibiotics including but not limited to GI upset, allergic reaction, drug rash, diarrhea, dizziness, photosensitivity, and yeast infections.  Rarely, more serious reactions can occur including but not limited to aplastic anemia, agranulocytosis, methemoglobinemia, blood dyscrasias, liver or kidney failure, lung infiltrates or desquamative/blistering drug rashes.
Topical Clindamycin Pregnancy And Lactation Text: This medication is Pregnancy Category B and is considered safe during pregnancy. It is unknown if it is excreted in breast milk.
Topical Retinoid Pregnancy And Lactation Text: This medication is Pregnancy Category C. It is unknown if this medication is excreted in breast milk.
Dapsone Pregnancy And Lactation Text: This medication is Pregnancy Category C and is not considered safe during pregnancy or breast feeding.
Use Enhanced Medication Counseling?: No
High Dose Vitamin A Pregnancy And Lactation Text: High dose vitamin A therapy is contraindicated during pregnancy and breast feeding.
Detail Level: Zone
Erythromycin Pregnancy And Lactation Text: This medication is Pregnancy Category B and is considered safe during pregnancy. It is also excreted in breast milk.
Tazorac Counseling:  Patient advised that medication is irritating and drying.  Patient may need to apply sparingly and wash off after an hour before eventually leaving it on overnight.  The patient verbalized understanding of the proper use and possible adverse effects of tazorac.  All of the patient's questions and concerns were addressed.
Bactrim Pregnancy And Lactation Text: This medication is Pregnancy Category D and is known to cause fetal risk.  It is also excreted in breast milk.
Spironolactone Counseling: Patient advised regarding risks of diarrhea, abdominal pain, hyperkalemia, birth defects (for female patients), liver toxicity and renal toxicity. The patient may need blood work to monitor liver and kidney function and potassium levels while on therapy. The patient verbalized understanding of the proper use and possible adverse effects of spironolactone.  All of the patient's questions and concerns were addressed.
Topical Sulfur Applications Counseling: Topical Sulfur Counseling: Patient counseled that this medication may cause skin irritation or allergic reactions.  In the event of skin irritation, the patient was advised to reduce the amount of the drug applied or use it less frequently.   The patient verbalized understanding of the proper use and possible adverse effects of topical sulfur application.  All of the patient's questions and concerns were addressed.
Minocycline Counseling: Patient advised regarding possible photosensitivity and discoloration of the teeth, skin, lips, tongue and gums.  Patient instructed to avoid sunlight, if possible.  When exposed to sunlight, patients should wear protective clothing, sunglasses, and sunscreen.  The patient was instructed to call the office immediately if the following severe adverse effects occur:  hearing changes, easy bruising/bleeding, severe headache, or vision changes.  The patient verbalized understanding of the proper use and possible adverse effects of minocycline.  All of the patient's questions and concerns were addressed.
Isotretinoin Counseling: Patient should get monthly blood tests, not donate blood, not drive at night if vision affected, not share medication, and not undergo elective surgery for 6 months after tx completed. Side effects reviewed, pt to contact office should one occur.
Benzoyl Peroxide Counseling: Patient counseled that medicine may cause skin irritation and bleach clothing.  In the event of skin irritation, the patient was advised to reduce the amount of the drug applied or use it less frequently.   The patient verbalized understanding of the proper use and possible adverse effects of benzoyl peroxide.  All of the patient's questions and concerns were addressed.
Doxycycline Counseling:  Patient counseled regarding possible photosensitivity and increased risk for sunburn.  Patient instructed to avoid sunlight, if possible.  When exposed to sunlight, patients should wear protective clothing, sunglasses, and sunscreen.  The patient was instructed to call the office immediately if the following severe adverse effects occur:  hearing changes, easy bruising/bleeding, severe headache, or vision changes.  The patient verbalized understanding of the proper use and possible adverse effects of doxycycline.  All of the patient's questions and concerns were addressed.
Birth Control Pills Counseling: Birth Control Pill Counseling: I discussed with the patient the potential side effects of OCPs including but not limited to increased risk of stroke, heart attack, thrombophlebitis, deep venous thrombosis, hepatic adenomas, breast changes, GI upset, headaches, and depression.  The patient verbalized understanding of the proper use and possible adverse effects of OCPs. All of the patient's questions and concerns were addressed.
Azithromycin Counseling:  I discussed with the patient the risks of azithromycin including but not limited to GI upset, allergic reaction, drug rash, diarrhea, and yeast infections.
Topical Sulfur Applications Pregnancy And Lactation Text: This medication is Pregnancy Category C and has an unknown safety profile during pregnancy. It is unknown if this topical medication is excreted in breast milk.

## 2021-08-12 DIAGNOSIS — Z01.818 ENCOUNTER FOR OTHER PREPROCEDURAL EXAMINATION: ICD-10-CM

## 2021-08-14 ENCOUNTER — APPOINTMENT (OUTPATIENT)
Dept: DISASTER EMERGENCY | Facility: CLINIC | Age: 76
End: 2021-08-14

## 2021-08-14 LAB — SARS-COV-2 N GENE NPH QL NAA+PROBE: NOT DETECTED

## 2021-08-17 ENCOUNTER — RESULT REVIEW (OUTPATIENT)
Age: 76
End: 2021-08-17

## 2021-08-17 ENCOUNTER — OUTPATIENT (OUTPATIENT)
Dept: OUTPATIENT SERVICES | Facility: HOSPITAL | Age: 76
LOS: 1 days | Discharge: HOME | End: 2021-08-17
Payer: MEDICARE

## 2021-08-17 VITALS
WEIGHT: 167.99 LBS | DIASTOLIC BLOOD PRESSURE: 92 MMHG | TEMPERATURE: 98 F | OXYGEN SATURATION: 100 % | SYSTOLIC BLOOD PRESSURE: 164 MMHG | HEART RATE: 83 BPM | RESPIRATION RATE: 13 BRPM | HEIGHT: 67 IN

## 2021-08-17 DIAGNOSIS — Z90.49 ACQUIRED ABSENCE OF OTHER SPECIFIED PARTS OF DIGESTIVE TRACT: Chronic | ICD-10-CM

## 2021-08-17 DIAGNOSIS — Z98.890 OTHER SPECIFIED POSTPROCEDURAL STATES: Chronic | ICD-10-CM

## 2021-08-17 DIAGNOSIS — R19.7 DIARRHEA, UNSPECIFIED: ICD-10-CM

## 2021-08-17 DIAGNOSIS — Z90.79 ACQUIRED ABSENCE OF OTHER GENITAL ORGAN(S): Chronic | ICD-10-CM

## 2021-08-17 DIAGNOSIS — Z41.9 ENCOUNTER FOR PROCEDURE FOR PURPOSES OTHER THAN REMEDYING HEALTH STATE, UNSPECIFIED: Chronic | ICD-10-CM

## 2021-08-17 PROCEDURE — 88305 TISSUE EXAM BY PATHOLOGIST: CPT

## 2021-08-17 PROCEDURE — 88305 TISSUE EXAM BY PATHOLOGIST: CPT | Mod: 26

## 2021-08-17 NOTE — ASU PREOP CHECKLIST - BMI (KG/M2)
26.3 Finasteride Counseling:  I discussed with the patient the risks of use of finasteride including but not limited to decreased libido, decreased ejaculate volume, gynecomastia, and depression. Women should not handle medication.  All of the patient's questions and concerns were addressed. Finasteride Male Counseling: Finasteride Counseling:  I discussed with the patient the risks of use of finasteride including but not limited to decreased libido, decreased ejaculate volume, gynecomastia, and depression. Women should not handle medication.  All of the patient's questions and concerns were addressed.

## 2021-08-17 NOTE — ASU PATIENT PROFILE, ADULT - NSICDXPASTMEDICALHX_GEN_ALL_CORE_FT
PAST MEDICAL HISTORY:  Anemia     BPH (benign prostatic hyperplasia)     History of retinopathy     HTN (hypertension)     Hypothyroidism     Prostatitis     SS (spinal stenosis)     Syncope     Throat cancer

## 2021-08-18 PROBLEM — Z00.00 ENCOUNTER FOR PREVENTIVE HEALTH EXAMINATION: Noted: 2021-08-18

## 2021-08-18 RX ORDER — OMEPRAZOLE 10 MG/1
1 CAPSULE, DELAYED RELEASE ORAL
Qty: 0 | Refills: 0 | DISCHARGE

## 2021-08-18 RX ORDER — HYOSCYAMINE SULFATE 0.13 MG
1 TABLET ORAL
Qty: 0 | Refills: 0 | DISCHARGE

## 2021-08-18 RX ORDER — TERAZOSIN HYDROCHLORIDE 10 MG/1
1 CAPSULE ORAL
Qty: 0 | Refills: 0 | DISCHARGE

## 2021-08-18 RX ORDER — ALUMINUM ZIRCONIUM TRICHLOROHYDREX GLY 0.2 G/G
1 STICK TOPICAL
Qty: 0 | Refills: 0 | DISCHARGE

## 2021-08-18 RX ORDER — LEVOTHYROXINE SODIUM 125 MCG
1 TABLET ORAL
Qty: 0 | Refills: 0 | DISCHARGE

## 2021-08-18 RX ORDER — LOSARTAN POTASSIUM 100 MG/1
1 TABLET, FILM COATED ORAL
Qty: 0 | Refills: 0 | DISCHARGE

## 2021-08-18 RX ORDER — METHYLCELLULOSE 500 MG
0 TABLET ORAL
Qty: 0 | Refills: 0 | DISCHARGE

## 2021-08-18 RX ORDER — ANASTROZOLE 1 MG/1
1 TABLET ORAL
Qty: 0 | Refills: 0 | DISCHARGE

## 2021-08-20 DIAGNOSIS — Z92.3 PERSONAL HISTORY OF IRRADIATION: ICD-10-CM

## 2021-08-20 DIAGNOSIS — K21.9 GASTRO-ESOPHAGEAL REFLUX DISEASE WITHOUT ESOPHAGITIS: ICD-10-CM

## 2021-08-20 DIAGNOSIS — E03.9 HYPOTHYROIDISM, UNSPECIFIED: ICD-10-CM

## 2021-08-20 DIAGNOSIS — R19.7 DIARRHEA, UNSPECIFIED: ICD-10-CM

## 2021-08-20 DIAGNOSIS — Z88.2 ALLERGY STATUS TO SULFONAMIDES: ICD-10-CM

## 2021-08-20 DIAGNOSIS — K57.30 DIVERTICULOSIS OF LARGE INTESTINE WITHOUT PERFORATION OR ABSCESS WITHOUT BLEEDING: ICD-10-CM

## 2021-08-20 DIAGNOSIS — Z88.1 ALLERGY STATUS TO OTHER ANTIBIOTIC AGENTS STATUS: ICD-10-CM

## 2021-08-20 DIAGNOSIS — Z90.49 ACQUIRED ABSENCE OF OTHER SPECIFIED PARTS OF DIGESTIVE TRACT: ICD-10-CM

## 2021-08-20 DIAGNOSIS — Z85.21 PERSONAL HISTORY OF MALIGNANT NEOPLASM OF LARYNX: ICD-10-CM

## 2021-08-20 DIAGNOSIS — Z87.891 PERSONAL HISTORY OF NICOTINE DEPENDENCE: ICD-10-CM

## 2021-08-20 DIAGNOSIS — I10 ESSENTIAL (PRIMARY) HYPERTENSION: ICD-10-CM

## 2021-08-20 DIAGNOSIS — M48.00 SPINAL STENOSIS, SITE UNSPECIFIED: ICD-10-CM

## 2021-08-20 DIAGNOSIS — D12.2 BENIGN NEOPLASM OF ASCENDING COLON: ICD-10-CM

## 2021-08-20 DIAGNOSIS — N40.0 BENIGN PROSTATIC HYPERPLASIA WITHOUT LOWER URINARY TRACT SYMPTOMS: ICD-10-CM

## 2021-08-20 DIAGNOSIS — Z88.5 ALLERGY STATUS TO NARCOTIC AGENT: ICD-10-CM

## 2021-08-20 DIAGNOSIS — K64.8 OTHER HEMORRHOIDS: ICD-10-CM

## 2021-09-10 PROBLEM — N41.9 INFLAMMATORY DISEASE OF PROSTATE, UNSPECIFIED: Chronic | Status: ACTIVE | Noted: 2021-08-17

## 2021-09-10 PROBLEM — Z86.69 PERSONAL HISTORY OF OTHER DISEASES OF THE NERVOUS SYSTEM AND SENSE ORGANS: Chronic | Status: ACTIVE | Noted: 2021-08-17

## 2021-09-10 PROBLEM — I10 ESSENTIAL (PRIMARY) HYPERTENSION: Chronic | Status: ACTIVE | Noted: 2021-08-17

## 2021-09-10 PROBLEM — E03.9 HYPOTHYROIDISM, UNSPECIFIED: Chronic | Status: ACTIVE | Noted: 2021-08-17

## 2021-09-10 PROBLEM — C14.0 MALIGNANT NEOPLASM OF PHARYNX, UNSPECIFIED: Chronic | Status: ACTIVE | Noted: 2021-08-17

## 2021-09-10 PROBLEM — D64.9 ANEMIA, UNSPECIFIED: Chronic | Status: ACTIVE | Noted: 2021-08-17

## 2021-09-10 PROBLEM — R55 SYNCOPE AND COLLAPSE: Chronic | Status: ACTIVE | Noted: 2021-08-17

## 2021-09-10 PROBLEM — N40.0 BENIGN PROSTATIC HYPERPLASIA WITHOUT LOWER URINARY TRACT SYMPTOMS: Chronic | Status: ACTIVE | Noted: 2021-08-17

## 2021-09-10 PROBLEM — M48.00 SPINAL STENOSIS, SITE UNSPECIFIED: Chronic | Status: ACTIVE | Noted: 2021-08-17

## 2021-09-29 ENCOUNTER — NON-APPOINTMENT (OUTPATIENT)
Age: 76
End: 2021-09-29

## 2021-10-14 ENCOUNTER — APPOINTMENT (OUTPATIENT)
Dept: UROLOGY | Facility: CLINIC | Age: 76
End: 2021-10-14
Payer: MEDICARE

## 2021-10-14 DIAGNOSIS — Z87.19 PERSONAL HISTORY OF OTHER DISEASES OF THE DIGESTIVE SYSTEM: ICD-10-CM

## 2021-10-14 DIAGNOSIS — N41.1 CHRONIC PROSTATITIS: ICD-10-CM

## 2021-10-14 DIAGNOSIS — Z85.819 PERSONAL HISTORY OF MALIGNANT NEOPLASM OF UNSPECIFIED SITE OF LIP, ORAL CAVITY, AND PHARYNX: ICD-10-CM

## 2021-10-14 PROCEDURE — 99204 OFFICE O/P NEW MOD 45 MIN: CPT

## 2021-10-14 RX ORDER — OMEPRAZOLE MAGNESIUM 40 MG/1
CAPSULE, DELAYED RELEASE ORAL
Refills: 0 | Status: ACTIVE | COMMUNITY

## 2021-10-14 RX ORDER — ANASTROZOLE 1 MG/1
TABLET ORAL
Refills: 0 | Status: ACTIVE | COMMUNITY

## 2021-10-14 RX ORDER — TERAZOSIN 1 MG/1
1 CAPSULE ORAL
Refills: 0 | Status: ACTIVE | COMMUNITY

## 2021-10-14 RX ORDER — BIFIDOBACTERIUM LONGUM 10MM CELL
CAPSULE ORAL
Refills: 0 | Status: ACTIVE | COMMUNITY

## 2021-10-14 RX ORDER — LEVOTHYROXINE SODIUM 137 UG/1
TABLET ORAL
Refills: 0 | Status: ACTIVE | COMMUNITY

## 2021-10-14 RX ORDER — LOSARTAN POTASSIUM 100 MG/1
TABLET, FILM COATED ORAL
Refills: 0 | Status: ACTIVE | COMMUNITY

## 2021-10-14 RX ORDER — FLUTICASONE PROPIONATE 50 UG/1
50 SPRAY, METERED NASAL DAILY
Qty: 3 | Refills: 4 | Status: DISCONTINUED | COMMUNITY
Start: 2018-05-03 | End: 2021-10-14

## 2021-10-14 NOTE — PHYSICAL EXAM
[General Appearance - Well Developed] : well developed [General Appearance - Well Nourished] : well nourished [Normal Appearance] : normal appearance [Well Groomed] : well groomed [General Appearance - In No Acute Distress] : no acute distress [Edema] : no peripheral edema [Respiration, Rhythm And Depth] : normal respiratory rhythm and effort [Exaggerated Use Of Accessory Muscles For Inspiration] : no accessory muscle use [Abdomen Soft] : soft [Abdomen Tenderness] : non-tender [Costovertebral Angle Tenderness] : no ~M costovertebral angle tenderness [Urethral Meatus] : meatus normal [Urinary Bladder Findings] : the bladder was normal on palpation [Scrotum] : the scrotum was normal [Testes Mass (___cm)] : there were no testicular masses [No Prostate Nodules] : no prostate nodules [Normal Station and Gait] : the gait and station were normal for the patient's age [] : no rash [No Focal Deficits] : no focal deficits [Oriented To Time, Place, And Person] : oriented to person, place, and time [Affect] : the affect was normal [Mood] : the mood was normal [Not Anxious] : not anxious [No Palpable Adenopathy] : no palpable adenopathy [FreeTextEntry1] : PFMs 3/4 (R>L) with +MTrPs b/l levators all groups, b/l OI

## 2021-10-14 NOTE — ASSESSMENT
[FreeTextEntry1] : 76 year old M with pelvic pain and PFM spasm on exam consistent with Pelvic Floor Dysfunction. We discussed that treatment for pelvic floor dysfunction include physical therapy and daily diazepam suppositories. Patient was given referral to physical therapist knowledgeable in PFD and Rx for Valium suppositories was faxed for appropriate pharmacy. He will RTO after 6 weeks of PT.

## 2021-10-14 NOTE — HISTORY OF PRESENT ILLNESS
[FreeTextEntry1] : 76 year old man seen 10/14/2021 with complaint of "chronic prostatitis." He had been managed by Dr Whitmore and Dr Carrillo for this for years. He had TURP for BPH by Dr Whitmore. His symptoms are pelvic pain, radiates to lower back and legs. This began years ago. \par It is moderate in severity. Nothing makes the symptoms better, nothing makes sx worse. \par It is associated with nothing.\par \par No family history contributory to pelvic floor dysfucntion.

## 2021-10-14 NOTE — REVIEW OF SYSTEMS
[Feeling Tired] : feeling tired [Eyesight Problems] : eyesight problems [Sore Throat] : sore throat [Abdominal Pain] : abdominal pain [Diarrhea] : diarrhea [see HPI] : see HPI [Impotence] : unable to obtain erection [Negative] : Heme/Lymph [FreeTextEntry2] : High Blood Pressure

## 2021-10-19 ENCOUNTER — TRANSCRIPTION ENCOUNTER (OUTPATIENT)
Age: 76
End: 2021-10-19

## 2022-01-19 ENCOUNTER — TRANSCRIPTION ENCOUNTER (OUTPATIENT)
Age: 77
End: 2022-01-19

## 2022-01-20 ENCOUNTER — TRANSCRIPTION ENCOUNTER (OUTPATIENT)
Age: 77
End: 2022-01-20

## 2022-02-15 ENCOUNTER — APPOINTMENT (OUTPATIENT)
Dept: UROLOGY | Facility: CLINIC | Age: 77
End: 2022-02-15
Payer: MEDICARE

## 2022-02-15 VITALS
SYSTOLIC BLOOD PRESSURE: 146 MMHG | DIASTOLIC BLOOD PRESSURE: 76 MMHG | BODY MASS INDEX: 28.25 KG/M2 | WEIGHT: 180 LBS | OXYGEN SATURATION: 100 % | HEART RATE: 75 BPM | HEIGHT: 67 IN

## 2022-02-15 PROCEDURE — 99213 OFFICE O/P EST LOW 20 MIN: CPT

## 2022-02-15 RX ORDER — CEPHALEXIN 500 MG/1
500 CAPSULE ORAL
Qty: 15 | Refills: 0 | Status: COMPLETED | COMMUNITY
Start: 2021-11-08

## 2022-02-15 RX ORDER — MUPIROCIN 20 MG/G
2 OINTMENT TOPICAL
Qty: 22 | Refills: 0 | Status: COMPLETED | COMMUNITY
Start: 2021-11-08

## 2022-02-15 RX ORDER — TERAZOSIN 5 MG/1
5 CAPSULE ORAL
Qty: 90 | Refills: 0 | Status: COMPLETED | COMMUNITY
Start: 2021-03-22

## 2022-02-15 NOTE — ASSESSMENT
[FreeTextEntry1] : 76 year old M with pelvic pain and PFM spasm on exam consistent with Pelvic Floor Dysfunction. Making good progress with PT and D10 supp. Recommend continue all for now. RTO in 3 months or sooner PRN.

## 2022-02-15 NOTE — PHYSICAL EXAM
[General Appearance - Well Developed] : well developed [General Appearance - Well Nourished] : well nourished [Normal Appearance] : normal appearance [Well Groomed] : well groomed [General Appearance - In No Acute Distress] : no acute distress [Abdomen Soft] : soft [Abdomen Tenderness] : non-tender [Costovertebral Angle Tenderness] : no ~M costovertebral angle tenderness [Urinary Bladder Findings] : the bladder was normal on palpation [Edema] : no peripheral edema [] : no respiratory distress [Respiration, Rhythm And Depth] : normal respiratory rhythm and effort [Exaggerated Use Of Accessory Muscles For Inspiration] : no accessory muscle use [Oriented To Time, Place, And Person] : oriented to person, place, and time [Affect] : the affect was normal [Mood] : the mood was normal [Not Anxious] : not anxious [No Focal Deficits] : no focal deficits [Normal Station and Gait] : the gait and station were normal for the patient's age [No Palpable Adenopathy] : no palpable adenopathy

## 2022-04-07 ENCOUNTER — APPOINTMENT (OUTPATIENT)
Dept: UROLOGY | Facility: CLINIC | Age: 77
End: 2022-04-07

## 2022-05-17 ENCOUNTER — APPOINTMENT (OUTPATIENT)
Dept: UROLOGY | Facility: CLINIC | Age: 77
End: 2022-05-17
Payer: MEDICARE

## 2022-05-17 VITALS — BODY MASS INDEX: 28.25 KG/M2 | WEIGHT: 180 LBS | HEIGHT: 67 IN

## 2022-05-17 PROCEDURE — 99213 OFFICE O/P EST LOW 20 MIN: CPT

## 2022-05-17 NOTE — ASSESSMENT
[FreeTextEntry1] : 76 year old M with pelvic pain and PFM spasm on exam consistent with Pelvic Floor Dysfunction. Sx well contgroleld with exercise, rectal wand and D10 supp. Recommend continue all for now. RTO in 12 months or sooner PRN.

## 2022-05-17 NOTE — HISTORY OF PRESENT ILLNESS
[FreeTextEntry1] : 76 year old man seen 10/14/2021 with complaint of "chronic prostatitis." He had been managed by Dr Whitmore and Dr Carrillo for this for years. He had TURP for BPH by Dr Whitmore. His symptoms are pelvic pain, radiates to lower back and legs. This began years ago. \par It is moderate in severity. Nothing makes the symptoms better, nothing makes sx worse. \par It is associated with nothing. No family history contributory to pelvic floor dysfucntion. \par \par 02/15/2022: Patient presents for follow up. He reports significant improvement of his pelvic pain with PT and diazepam suppositories. Overall, very happy with results. Still working with PT and making improvement.  \par \par 05/17/2022: Patient presents for follow up. He reports symptoms remain well controlled with suppositories and wanding. No new complaints.

## 2023-06-08 RX ORDER — DIAZEPAM 2 MG/1
2 TABLET ORAL
Qty: 30 | Refills: 0 | Status: COMPLETED | COMMUNITY
Start: 2022-05-17 | End: 2023-06-08

## 2023-06-13 ENCOUNTER — NON-APPOINTMENT (OUTPATIENT)
Age: 78
End: 2023-06-13

## 2023-07-06 ENCOUNTER — APPOINTMENT (OUTPATIENT)
Dept: UROLOGY | Facility: CLINIC | Age: 78
End: 2023-07-06
Payer: MEDICARE

## 2023-07-06 VITALS
HEART RATE: 74 BPM | SYSTOLIC BLOOD PRESSURE: 126 MMHG | WEIGHT: 170 LBS | OXYGEN SATURATION: 97 % | DIASTOLIC BLOOD PRESSURE: 68 MMHG | HEIGHT: 67 IN | BODY MASS INDEX: 26.68 KG/M2

## 2023-07-06 DIAGNOSIS — M62.89 OTHER SPECIFIED DISORDERS OF MUSCLE: ICD-10-CM

## 2023-07-06 PROCEDURE — 99213 OFFICE O/P EST LOW 20 MIN: CPT

## 2023-07-06 NOTE — ASSESSMENT
[FreeTextEntry1] : 76 year old M with pelvic pain and PFM spasm on exam consistent with Pelvic Floor Dysfunction. Sx well contgroleld with exercise, rectal wand and D10 supp. Recommend continue all for now. For nocturia will do FVC. RTO in 12 months or sooner PRN.

## 2023-07-06 NOTE — HISTORY OF PRESENT ILLNESS
[FreeTextEntry1] : 76 year old man seen 10/14/2021 with complaint of "chronic prostatitis." He had been managed by Dr Whitmore and Dr Carrillo for this for years. He had TURP for BPH by Dr Whitmore. His symptoms are pelvic pain, radiates to lower back and legs. This began years ago. \par It is moderate in severity. Nothing makes the symptoms better, nothing makes sx worse. \par It is associated with nothing. No family history contributory to pelvic floor dysfucntion. \par \par 02/15/2022: Patient presents for follow up. He reports significant improvement of his pelvic pain with PT and diazepam suppositories. Overall, very happy with results. Still working with PT and making improvement.  \par \par 05/17/2022: Patient presents for follow up. He reports symptoms remain well controlled with suppositories and wanding. No new complaints.\par \par 07/06/2023: Patient presents for follow up. He reports pelvic pain remains well control with PT and D10 supp, thogh he says he has recurrence of symptoms if either of these are stopped. He also c/o nocturia 3-4x/night.

## 2023-08-15 ENCOUNTER — OFFICE (OUTPATIENT)
Dept: URBAN - METROPOLITAN AREA CLINIC 12 | Facility: CLINIC | Age: 78
Setting detail: OPHTHALMOLOGY
End: 2023-08-15
Payer: MEDICARE

## 2023-08-15 DIAGNOSIS — H35.373: ICD-10-CM

## 2023-08-15 DIAGNOSIS — H43.813: ICD-10-CM

## 2023-08-15 PROBLEM — H52.7 REFRACTIVE ERROR: Status: ACTIVE | Noted: 2023-08-15

## 2023-08-15 PROBLEM — H25.13 CATARACT; BOTH EYES: Status: ACTIVE | Noted: 2023-08-15

## 2023-08-15 PROCEDURE — 92134 CPTRZ OPH DX IMG PST SGM RTA: CPT | Performed by: OPHTHALMOLOGY

## 2023-08-15 PROCEDURE — 92004 COMPRE OPH EXAM NEW PT 1/>: CPT | Performed by: OPHTHALMOLOGY

## 2023-08-15 ASSESSMENT — AXIALLENGTH_DERIVED
OS_AL: 22.1608
OD_AL: 22.4016

## 2023-08-15 ASSESSMENT — VISUAL ACUITY
OS_BCVA: 20/25-2
OD_BCVA: 20/25+2

## 2023-08-15 ASSESSMENT — KERATOMETRY
OS_AXISANGLE_DEGREES: 090
OD_AXISANGLE_DEGREES: 007
OD_K2POWER_DIOPTERS: 46.50
OD_K1POWER_DIOPTERS: 46.00
OS_K1POWER_DIOPTERS: 47.25
OS_K2POWER_DIOPTERS: 47.25

## 2023-08-15 ASSESSMENT — TONOMETRY
OS_IOP_MMHG: 21
OD_IOP_MMHG: 16

## 2023-08-15 ASSESSMENT — CONFRONTATIONAL VISUAL FIELD TEST (CVF)
OD_FINDINGS: FULL
OS_FINDINGS: FULL

## 2023-08-15 ASSESSMENT — REFRACTION_AUTOREFRACTION
OS_CYLINDER: -0.75
OS_AXIS: 085
OS_SPHERE: +0.75
OD_AXIS: 083
OD_SPHERE: +1.25
OD_CYLINDER: -1.25

## 2023-08-15 ASSESSMENT — SPHEQUIV_DERIVED
OD_SPHEQUIV: 0.625
OS_SPHEQUIV: 0.375

## 2023-09-29 ENCOUNTER — TRANSCRIPTION ENCOUNTER (OUTPATIENT)
Age: 78
End: 2023-09-29

## 2023-10-26 ENCOUNTER — APPOINTMENT (OUTPATIENT)
Dept: UROLOGY | Facility: CLINIC | Age: 78
End: 2023-10-26
Payer: MEDICARE

## 2023-10-26 VITALS
BODY MASS INDEX: 26.68 KG/M2 | HEIGHT: 67 IN | DIASTOLIC BLOOD PRESSURE: 70 MMHG | RESPIRATION RATE: 16 BRPM | WEIGHT: 170 LBS | SYSTOLIC BLOOD PRESSURE: 134 MMHG | HEART RATE: 85 BPM | OXYGEN SATURATION: 96 %

## 2023-10-26 DIAGNOSIS — R35.1 NOCTURIA: ICD-10-CM

## 2023-10-26 DIAGNOSIS — N32.81 OVERACTIVE BLADDER: ICD-10-CM

## 2023-10-26 PROCEDURE — 99213 OFFICE O/P EST LOW 20 MIN: CPT

## 2023-10-26 RX ORDER — VIBEGRON 75 MG/1
75 TABLET, FILM COATED ORAL
Qty: 90 | Refills: 3 | Status: ACTIVE | COMMUNITY
Start: 2023-10-26 | End: 1900-01-01

## 2023-10-27 LAB
APPEARANCE: CLEAR
BACTERIA: NEGATIVE /HPF
BILIRUBIN URINE: NEGATIVE
BLOOD URINE: NEGATIVE
CAST: 0 /LPF
COLOR: YELLOW
EPITHELIAL CELLS: 0 /HPF
GLUCOSE QUALITATIVE U: NEGATIVE MG/DL
KETONES URINE: NEGATIVE MG/DL
LEUKOCYTE ESTERASE URINE: NEGATIVE
MICROSCOPIC-UA: NORMAL
NITRITE URINE: NEGATIVE
PH URINE: 6
PROTEIN URINE: NEGATIVE MG/DL
RED BLOOD CELLS URINE: 0 /HPF
SPECIFIC GRAVITY URINE: 1.02
UROBILINOGEN URINE: 0.2 MG/DL
WHITE BLOOD CELLS URINE: 0 /HPF

## 2023-10-30 LAB — BACTERIA UR CULT: NORMAL

## 2023-11-30 ENCOUNTER — TRANSCRIPTION ENCOUNTER (OUTPATIENT)
Age: 78
End: 2023-11-30

## 2023-11-30 RX ORDER — MIRABEGRON 50 MG/1
50 TABLET, FILM COATED, EXTENDED RELEASE ORAL
Qty: 90 | Refills: 3 | Status: ACTIVE | COMMUNITY
Start: 2023-11-30 | End: 1900-01-01

## 2023-12-30 ENCOUNTER — RX RENEWAL (OUTPATIENT)
Age: 78
End: 2023-12-30

## 2024-05-15 ENCOUNTER — TRANSCRIPTION ENCOUNTER (OUTPATIENT)
Age: 79
End: 2024-05-15

## 2024-05-15 RX ORDER — DIAZEPAM 5 MG/1
5 TABLET ORAL
Qty: 30 | Refills: 5 | Status: ACTIVE | COMMUNITY
Start: 2021-10-14 | End: 1900-01-01

## 2024-09-17 ENCOUNTER — OUTPATIENT (OUTPATIENT)
Dept: OUTPATIENT SERVICES | Facility: HOSPITAL | Age: 79
LOS: 1 days | Discharge: HOME | End: 2024-09-17
Payer: MEDICARE

## 2024-09-17 VITALS
HEIGHT: 67 IN | RESPIRATION RATE: 12 BRPM | HEART RATE: 84 BPM | DIASTOLIC BLOOD PRESSURE: 82 MMHG | TEMPERATURE: 97 F | WEIGHT: 164.91 LBS | SYSTOLIC BLOOD PRESSURE: 168 MMHG | OXYGEN SATURATION: 100 %

## 2024-09-17 DIAGNOSIS — Z98.890 OTHER SPECIFIED POSTPROCEDURAL STATES: Chronic | ICD-10-CM

## 2024-09-17 DIAGNOSIS — Z41.9 ENCOUNTER FOR PROCEDURE FOR PURPOSES OTHER THAN REMEDYING HEALTH STATE, UNSPECIFIED: Chronic | ICD-10-CM

## 2024-09-17 DIAGNOSIS — Z90.49 ACQUIRED ABSENCE OF OTHER SPECIFIED PARTS OF DIGESTIVE TRACT: Chronic | ICD-10-CM

## 2024-09-17 DIAGNOSIS — K58.2 MIXED IRRITABLE BOWEL SYNDROME: ICD-10-CM

## 2024-09-17 DIAGNOSIS — Z90.79 ACQUIRED ABSENCE OF OTHER GENITAL ORGAN(S): Chronic | ICD-10-CM

## 2024-09-17 PROCEDURE — 88305 TISSUE EXAM BY PATHOLOGIST: CPT

## 2024-09-17 PROCEDURE — 88305 TISSUE EXAM BY PATHOLOGIST: CPT | Mod: 26

## 2024-09-17 RX ORDER — ROSUVASTATIN CALCIUM 10 MG/1
1 TABLET ORAL
Refills: 0 | DISCHARGE

## 2024-09-17 NOTE — ASU PATIENT PROFILE, ADULT - NSICDXPASTSURGICALHX_GEN_ALL_CORE_FT
PAST SURGICAL HISTORY:  Elective surgery surgery for throat cancer 2003    History of appendectomy     History of throat surgery     S/P appendectomy 1956    S/P colonoscopy 10/2018    S/P TURP

## 2024-09-17 NOTE — ASU PATIENT PROFILE, ADULT - BLOOD AVOIDANCE/RESTRICTIONS, PROFILE
TRANSFER - IN REPORT:    Verbal report received from [facility] on [facility]  being received from [facility] for routine progression of patient care      Report consisted of patient's Situation, Background, Assessment and   Recommendations(SBAR). Information from the following report(s) Nurse Handoff Report, MAR, Recent Results and Cardiac Rhythm SR with BBB was reviewed with the receiving nurse. Opportunity for questions and clarification was provided. Assessment completed upon patient's arrival to unit and care assumed. none

## 2024-09-17 NOTE — ASU PATIENT PROFILE, ADULT - NSICDXPASTMEDICALHX_GEN_ALL_CORE_FT
PAST MEDICAL HISTORY:  Adenoma colon    Anemia     BPH (benign prostatic hyperplasia)     BPH (benign prostatic hyperplasia)     Essential hypertension     GERD (gastroesophageal reflux disease)     Hearing aid worn b/l    History of retinopathy     HTN (hypertension)     Hypothyroidism     Hypothyroidism, secondary     Obesity     Prostatitis     Prostatitis     Retinopathy of right eye     Spinal stenosis of lumbar region     SS (spinal stenosis)     Syncope     Thalassemia minor     Throat cancer radiation x 6 weeks 2003    Throat cancer

## 2024-09-18 LAB — SURGICAL PATHOLOGY STUDY: SIGNIFICANT CHANGE UP

## 2024-09-19 DIAGNOSIS — I10 ESSENTIAL (PRIMARY) HYPERTENSION: ICD-10-CM

## 2024-09-19 DIAGNOSIS — N40.0 BENIGN PROSTATIC HYPERPLASIA WITHOUT LOWER URINARY TRACT SYMPTOMS: ICD-10-CM

## 2024-09-19 DIAGNOSIS — Z92.3 PERSONAL HISTORY OF IRRADIATION: ICD-10-CM

## 2024-09-19 DIAGNOSIS — K64.8 OTHER HEMORRHOIDS: ICD-10-CM

## 2024-09-19 DIAGNOSIS — Z88.1 ALLERGY STATUS TO OTHER ANTIBIOTIC AGENTS: ICD-10-CM

## 2024-09-19 DIAGNOSIS — Z12.11 ENCOUNTER FOR SCREENING FOR MALIGNANT NEOPLASM OF COLON: ICD-10-CM

## 2024-09-19 DIAGNOSIS — E78.5 HYPERLIPIDEMIA, UNSPECIFIED: ICD-10-CM

## 2024-09-19 DIAGNOSIS — Z86.010 PERSONAL HISTORY OF COLON POLYPS: ICD-10-CM

## 2024-09-19 DIAGNOSIS — Z88.0 ALLERGY STATUS TO PENICILLIN: ICD-10-CM

## 2024-09-19 DIAGNOSIS — E03.9 HYPOTHYROIDISM, UNSPECIFIED: ICD-10-CM

## 2024-09-19 DIAGNOSIS — D12.3 BENIGN NEOPLASM OF TRANSVERSE COLON: ICD-10-CM

## 2024-09-19 DIAGNOSIS — K57.30 DIVERTICULOSIS OF LARGE INTESTINE WITHOUT PERFORATION OR ABSCESS WITHOUT BLEEDING: ICD-10-CM

## 2024-10-03 ENCOUNTER — APPOINTMENT (OUTPATIENT)
Dept: UROLOGY | Facility: CLINIC | Age: 79
End: 2024-10-03
Payer: MEDICARE

## 2024-10-03 VITALS
SYSTOLIC BLOOD PRESSURE: 124 MMHG | DIASTOLIC BLOOD PRESSURE: 72 MMHG | WEIGHT: 165 LBS | RESPIRATION RATE: 16 BRPM | HEART RATE: 74 BPM | BODY MASS INDEX: 25.9 KG/M2 | HEIGHT: 67 IN | OXYGEN SATURATION: 98 %

## 2024-10-03 DIAGNOSIS — R35.1 NOCTURIA: ICD-10-CM

## 2024-10-03 DIAGNOSIS — M62.89 OTHER SPECIFIED DISORDERS OF MUSCLE: ICD-10-CM

## 2024-10-03 DIAGNOSIS — N32.81 OVERACTIVE BLADDER: ICD-10-CM

## 2024-10-03 PROCEDURE — 99213 OFFICE O/P EST LOW 20 MIN: CPT

## 2024-10-03 NOTE — PHYSICAL EXAM
[General Appearance - Well Developed] : well developed [General Appearance - Well Nourished] : well nourished [Edema] : no peripheral edema [Not Anxious] : not anxious [No Palpable Adenopathy] : no palpable adenopathy [Normal Appearance] : normal appearance [Well Groomed] : well groomed [General Appearance - In No Acute Distress] : no acute distress [] : no respiratory distress [Respiration, Rhythm And Depth] : normal respiratory rhythm and effort [Exaggerated Use Of Accessory Muscles For Inspiration] : no accessory muscle use [Abdomen Soft] : soft [Abdomen Tenderness] : non-tender [Costovertebral Angle Tenderness] : no ~M costovertebral angle tenderness [Urinary Bladder Findings] : the bladder was normal on palpation [Normal Station and Gait] : the gait and station were normal for the patient's age [No Focal Deficits] : no focal deficits [Oriented To Time, Place, And Person] : oriented to person, place, and time [Affect] : the affect was normal [Mood] : the mood was normal

## 2024-10-04 LAB
APPEARANCE: CLEAR
BACTERIA: NEGATIVE /HPF
BILIRUBIN URINE: NEGATIVE
BLOOD URINE: NEGATIVE
CAST: 0 /LPF
COLOR: NORMAL
EPITHELIAL CELLS: 0 /HPF
GLUCOSE QUALITATIVE U: NEGATIVE MG/DL
KETONES URINE: NEGATIVE MG/DL
LEUKOCYTE ESTERASE URINE: ABNORMAL
MICROSCOPIC-UA: NORMAL
NITRITE URINE: NEGATIVE
PH URINE: 5.5
PROTEIN URINE: NORMAL MG/DL
RED BLOOD CELLS URINE: 1 /HPF
SPECIFIC GRAVITY URINE: 1.02
UROBILINOGEN URINE: 1 MG/DL
WHITE BLOOD CELLS URINE: 0 /HPF

## 2024-10-04 NOTE — REVIEW OF SYSTEMS
[Feeling Tired] : feeling tired [Eyesight Problems] : eyesight problems [Sore Throat] : sore throat [Abdominal Pain] : abdominal pain [Diarrhea] : diarrhea [see HPI] : see HPI [Impotence] : unable to obtain erection [Negative] : Heme/Lymph [Nocturia] : nocturia [FreeTextEntry2] : High Blood Pressure

## 2024-10-04 NOTE — ASSESSMENT
[FreeTextEntry1] : 79-year-old male with PFD, continue PFPT and D10 supp. Can Rx valium 8mg suppositories if needed.   For nocturia, can continue OAB medications. if symptoms worsen can consider Desmopressin.   Patient will RTO in 6 months or sooner if needed.

## 2024-10-04 NOTE — HISTORY OF PRESENT ILLNESS
[FreeTextEntry1] : 76 year old man seen 10/14/2021 with complaint of "chronic prostatitis." He had been managed by Dr Whitmore and Dr Carrillo for this for years. He had TURP for BPH by Dr Whitmore. His symptoms are pelvic pain, radiates to lower back and legs. This began years ago.  It is moderate in severity. Nothing makes the symptoms better, nothing makes sx worse.  It is associated with nothing. No family history contributory to pelvic floor dysfucntion.   02/15/2022: Patient presents for follow up. He reports significant improvement of his pelvic pain with PT and diazepam suppositories. Overall, very happy with results. Still working with PT and making improvement.    05/17/2022: Patient presents for follow up. He reports symptoms remain well controlled with suppositories and wanding. No new complaints.  07/06/2023: Patient presents for follow up. He reports pelvic pain remains well control with PT and D10 supp, thogh he says he has recurrence of symptoms if either of these are stopped. He also c/o nocturia 3-4x/night.  10/26/2023: Patient presents for follow up. He reports PFD continues to respond to D10 supp and PFPT, but he does feel sx worsen if he stops these therapies. Nocturia contines, He did FVC.  Frequency Volume Chart 24 hr Voids: 11 24 hr Volume: 1260 mL Nocturnal Voids: 5 Nocturnal Volume: 840 mL  N / 24 hr volume: 67% MVV: 240  10/03/24: Patient presents for follow up. Reports PFD is well managed with valium 5mg suppositories. He recently experienced an exacerbation of symptoms s/p colonoscopy. He states 5mg was not enough, but he felt 10mg was too high a dose. He is requesting 8mg valium suppositories PRN for exacerbations. On Myrbetriq his urgency improved and nocturia reduced to 1-2x. PVR 18ml. Denies dysuria, hematuria, lower abdominal or flank pain, fever, chills or rigors.

## 2024-10-07 LAB — BACTERIA UR CULT: NORMAL

## 2024-11-12 ENCOUNTER — OFFICE (OUTPATIENT)
Dept: URBAN - METROPOLITAN AREA CLINIC 12 | Facility: CLINIC | Age: 79
Setting detail: OPHTHALMOLOGY
End: 2024-11-12
Payer: MEDICARE

## 2024-11-12 DIAGNOSIS — H43.813: ICD-10-CM

## 2024-11-12 DIAGNOSIS — H25.13: ICD-10-CM

## 2024-11-12 DIAGNOSIS — H35.373: ICD-10-CM

## 2024-11-12 PROCEDURE — 92014 COMPRE OPH EXAM EST PT 1/>: CPT | Performed by: OPHTHALMOLOGY

## 2024-11-12 PROCEDURE — 92134 CPTRZ OPH DX IMG PST SGM RTA: CPT | Performed by: OPHTHALMOLOGY

## 2024-11-12 ASSESSMENT — REFRACTION_MANIFEST
OS_CYLINDER: -0.75
OS_VA1: 20/25
OD_VA1: 20/25
OD_CYLINDER: -0.75
OS_SPHERE: +0.75
OD_AXIS: 090
OS_AXIS: 090
OD_SPHERE: +1.00

## 2024-11-12 ASSESSMENT — KERATOMETRY
OD_K2POWER_DIOPTERS: 46.50
OS_K1POWER_DIOPTERS: 46.75
OD_K1POWER_DIOPTERS: 46.00
OS_AXISANGLE_DEGREES: 090
OD_AXISANGLE_DEGREES: 007
OS_K2POWER_DIOPTERS: 46.75

## 2024-11-12 ASSESSMENT — REFRACTION_AUTOREFRACTION
OS_SPHERE: +1.25
OD_AXIS: 090
OD_CYLINDER: -1.00
OS_CYLINDER: -1.00
OD_SPHERE: +1.50
OS_AXIS: 090

## 2024-11-12 ASSESSMENT — TONOMETRY
OD_IOP_MMHG: 15
OS_IOP_MMHG: 18

## 2024-11-12 ASSESSMENT — VISUAL ACUITY
OD_BCVA: 20/25
OS_BCVA: 20/30+2

## 2024-11-12 ASSESSMENT — CONFRONTATIONAL VISUAL FIELD TEST (CVF)
OS_FINDINGS: FULL
OD_FINDINGS: FULL

## 2024-12-30 ENCOUNTER — RX RENEWAL (OUTPATIENT)
Age: 79
End: 2024-12-30

## 2024-12-30 ENCOUNTER — TRANSCRIPTION ENCOUNTER (OUTPATIENT)
Age: 79
End: 2024-12-30

## 2025-01-02 ENCOUNTER — TRANSCRIPTION ENCOUNTER (OUTPATIENT)
Age: 80
End: 2025-01-02

## 2025-01-06 ENCOUNTER — TRANSCRIPTION ENCOUNTER (OUTPATIENT)
Age: 80
End: 2025-01-06

## 2025-05-16 ENCOUNTER — OFFICE (OUTPATIENT)
Dept: URBAN - METROPOLITAN AREA CLINIC 12 | Facility: CLINIC | Age: 80
Setting detail: OPHTHALMOLOGY
End: 2025-05-16
Payer: MEDICARE

## 2025-05-16 DIAGNOSIS — H25.13: ICD-10-CM

## 2025-05-16 DIAGNOSIS — H43.813: ICD-10-CM

## 2025-05-16 DIAGNOSIS — H34.8330: ICD-10-CM

## 2025-05-16 DIAGNOSIS — H35.373: ICD-10-CM

## 2025-05-16 PROCEDURE — 92250 FUNDUS PHOTOGRAPHY W/I&R: CPT | Performed by: OPHTHALMOLOGY

## 2025-05-16 PROCEDURE — 92014 COMPRE OPH EXAM EST PT 1/>: CPT | Performed by: OPHTHALMOLOGY

## 2025-05-16 ASSESSMENT — TONOMETRY
OS_IOP_MMHG: 19
OD_IOP_MMHG: 16

## 2025-05-16 ASSESSMENT — KERATOMETRY
OD_K2POWER_DIOPTERS: 46.50
OS_K2POWER_DIOPTERS: 47.00
OS_K1POWER_DIOPTERS: 246.50
OD_K1POWER_DIOPTERS: 46.00
OS_AXISANGLE_DEGREES: 072
OD_AXISANGLE_DEGREES: 022

## 2025-05-16 ASSESSMENT — CONFRONTATIONAL VISUAL FIELD TEST (CVF)
OD_FINDINGS: FULL
OS_FINDINGS: FULL

## 2025-05-16 ASSESSMENT — REFRACTION_AUTOREFRACTION
OD_AXIS: 087
OD_SPHERE: +1.75
OS_SPHERE: +1.25
OD_CYLINDER: -1.25
OS_AXIS: 092
OS_CYLINDER: -1.50

## 2025-05-16 ASSESSMENT — VISUAL ACUITY
OS_BCVA: 20/30+2
OD_BCVA: 20/30+2

## 2025-05-16 ASSESSMENT — REFRACTION_MANIFEST
OS_SPHERE: +0.75
OS_AXIS: 090
OS_VA1: 20/25
OS_CYLINDER: -0.75
OD_SPHERE: +1.00
OD_CYLINDER: -0.75
OD_VA1: 20/25
OD_AXIS: 090